# Patient Record
Sex: MALE | NOT HISPANIC OR LATINO | Employment: OTHER | URBAN - METROPOLITAN AREA
[De-identification: names, ages, dates, MRNs, and addresses within clinical notes are randomized per-mention and may not be internally consistent; named-entity substitution may affect disease eponyms.]

---

## 2024-05-04 ENCOUNTER — HOSPITAL ENCOUNTER (EMERGENCY)
Facility: HOSPITAL | Age: 69
Discharge: HOME | End: 2024-05-04
Attending: STUDENT IN AN ORGANIZED HEALTH CARE EDUCATION/TRAINING PROGRAM
Payer: MEDICARE

## 2024-05-04 ENCOUNTER — APPOINTMENT (EMERGENCY)
Dept: RADIOLOGY | Facility: HOSPITAL | Age: 69
End: 2024-05-04
Payer: MEDICARE

## 2024-05-04 VITALS
RESPIRATION RATE: 16 BRPM | SYSTOLIC BLOOD PRESSURE: 195 MMHG | HEART RATE: 74 BPM | TEMPERATURE: 97.9 F | DIASTOLIC BLOOD PRESSURE: 88 MMHG | OXYGEN SATURATION: 96 %

## 2024-05-04 DIAGNOSIS — S01.81XA FACIAL LACERATION, INITIAL ENCOUNTER: ICD-10-CM

## 2024-05-04 DIAGNOSIS — S09.90XA INJURY OF HEAD, INITIAL ENCOUNTER: Primary | ICD-10-CM

## 2024-05-04 PROCEDURE — 99284 EMERGENCY DEPT VISIT MOD MDM: CPT

## 2024-05-04 PROCEDURE — 90471 IMMUNIZATION ADMIN: CPT

## 2024-05-04 PROCEDURE — 99284 EMERGENCY DEPT VISIT MOD MDM: CPT | Performed by: PHYSICIAN ASSISTANT

## 2024-05-04 PROCEDURE — 12011 RPR F/E/E/N/L/M 2.5 CM/<: CPT | Performed by: PHYSICIAN ASSISTANT

## 2024-05-04 PROCEDURE — 90715 TDAP VACCINE 7 YRS/> IM: CPT | Performed by: PHYSICIAN ASSISTANT

## 2024-05-04 PROCEDURE — 70450 CT HEAD/BRAIN W/O DYE: CPT

## 2024-05-04 RX ORDER — GINSENG 100 MG
1 CAPSULE ORAL ONCE
Status: COMPLETED | OUTPATIENT
Start: 2024-05-04 | End: 2024-05-04

## 2024-05-04 RX ORDER — MULTIVIT WITH MINERALS/LUTEIN
1000 TABLET ORAL DAILY
COMMUNITY

## 2024-05-04 RX ORDER — EVOLOCUMAB 140 MG/ML
140 INJECTION, SOLUTION SUBCUTANEOUS
COMMUNITY

## 2024-05-04 RX ORDER — LIDOCAINE HYDROCHLORIDE AND EPINEPHRINE 10; 10 MG/ML; UG/ML
10 INJECTION, SOLUTION INFILTRATION; PERINEURAL ONCE
Status: COMPLETED | OUTPATIENT
Start: 2024-05-04 | End: 2024-05-04

## 2024-05-04 RX ORDER — LOSARTAN POTASSIUM 25 MG/1
25 TABLET ORAL DAILY
COMMUNITY

## 2024-05-04 RX ORDER — FLUOXETINE HYDROCHLORIDE 20 MG/1
20 CAPSULE ORAL DAILY
COMMUNITY

## 2024-05-04 RX ORDER — ASPIRIN 81 MG/1
81 TABLET, CHEWABLE ORAL DAILY
COMMUNITY

## 2024-05-04 RX ORDER — CLOPIDOGREL BISULFATE 75 MG/1
75 TABLET ORAL DAILY
COMMUNITY

## 2024-05-04 RX ORDER — LANSOPRAZOLE 30 MG/1
30 CAPSULE, DELAYED RELEASE ORAL DAILY
COMMUNITY

## 2024-05-04 RX ADMIN — BACITRACIN 1 SMALL APPLICATION: 500 OINTMENT TOPICAL at 08:50

## 2024-05-04 RX ADMIN — LIDOCAINE HYDROCHLORIDE,EPINEPHRINE BITARTRATE 10 ML: 10; .01 INJECTION, SOLUTION INFILTRATION; PERINEURAL at 08:50

## 2024-05-04 RX ADMIN — TETANUS TOXOID, REDUCED DIPHTHERIA TOXOID AND ACELLULAR PERTUSSIS VACCINE, ADSORBED 0.5 ML: 5; 2.5; 8; 8; 2.5 SUSPENSION INTRAMUSCULAR at 08:51

## 2024-05-04 NOTE — ED PROVIDER NOTES
History  Chief Complaint   Patient presents with    Head Injury     Pt reports fall yesterday where he tripped over a life jacket in his boat, and hit his head on the corner of his boat. Pt has lac with bruising to his right periorbital area, on a thinner, he thinks it might be coumadin. Reports no other symptoms since accident besides the laceration     67 y/o male, on Plavix, presenting today for evaluation of a right-sided head injury that occurred yesterday, while he was working on his boat, states that he tripped over a light jacket and struck the right portion of his outer eyebrow, did not lose consciousness however continues with some oozing after he changed the bandage this morning.  Unsure if he needed stitches.  He also notes an abrasion to the lower back however states this is not bothering him.  He otherwise has been feeling very well.  Denies LOC, neck or back pain, numbness, paresthesias, lightheadedness, dizziness, weakness, headaches, changes in vision.  Differential includes but is not limited to intracranial abnormality, fracture, laceration, abrasion etc.        Prior to Admission Medications   Prescriptions Last Dose Informant Patient Reported? Taking?   Ascorbic Acid (vitamin C) 1000 MG tablet 5/3/2024  Yes Yes   Sig: Take 1,000 mg by mouth daily   Evolocumab (Repatha SureClick) 140 MG/ML SOAJ   Yes Yes   Sig: Inject 140 mg under the skin every 14 (fourteen) days   FLUoxetine (PROzac) 20 mg capsule 5/3/2024  Yes Yes   Sig: Take 20 mg by mouth daily   aspirin 81 mg chewable tablet 5/3/2024  Yes Yes   Sig: Chew 81 mg daily   clopidogrel (PLAVIX) 75 mg tablet 5/3/2024  Yes Yes   Sig: Take 75 mg by mouth daily   lansoprazole (PREVACID) 30 mg capsule 5/3/2024  Yes Yes   Sig: Take 30 mg by mouth daily   losartan (COZAAR) 25 mg tablet 5/3/2024  Yes Yes   Sig: Take 25 mg by mouth daily   metoprolol tartrate (LOPRESSOR) 25 mg tablet 5/3/2024  Yes Yes   Sig: Take 25 mg by mouth every 12 (twelve) hours       Facility-Administered Medications: None       History reviewed. No pertinent past medical history.    History reviewed. No pertinent surgical history.    History reviewed. No pertinent family history.  I have reviewed and agree with the history as documented.    E-Cigarette/Vaping     E-Cigarette/Vaping Substances          Review of Systems   Constitutional: Negative.  Negative for chills, fatigue and fever.   HENT: Negative.  Negative for congestion, postnasal drip, rhinorrhea and sore throat.    Eyes: Negative.    Respiratory: Negative.  Negative for cough, shortness of breath and wheezing.    Cardiovascular: Negative.    Gastrointestinal: Negative.  Negative for abdominal pain, diarrhea, nausea and vomiting.   Endocrine: Negative.    Genitourinary: Negative.    Musculoskeletal: Negative.    Skin:  Positive for color change and wound. Negative for pallor and rash.   Neurological: Negative.    Hematological: Negative.    Psychiatric/Behavioral: Negative.     All other systems reviewed and are negative.      Physical Exam  Physical Exam  Vitals and nursing note reviewed.   Constitutional:       General: He is not in acute distress.     Appearance: Normal appearance. He is well-developed and normal weight. He is not diaphoretic.   HENT:      Head:        Right Ear: External ear normal.      Left Ear: External ear normal.      Nose: Nose normal.      Mouth/Throat:      Pharynx: No oropharyngeal exudate.   Eyes:      General: No scleral icterus.        Right eye: No discharge.         Left eye: No discharge.      Conjunctiva/sclera: Conjunctivae normal.      Pupils: Pupils are equal, round, and reactive to light.   Cardiovascular:      Rate and Rhythm: Normal rate and regular rhythm.      Pulses: Normal pulses.      Heart sounds: Normal heart sounds. No murmur heard.     No friction rub. No gallop.   Pulmonary:      Effort: Pulmonary effort is normal. No respiratory distress.      Breath sounds: Normal breath sounds.  No wheezing or rales.   Chest:      Chest wall: No tenderness.   Abdominal:      General: Bowel sounds are normal. There is no distension.      Palpations: Abdomen is soft. There is no mass.      Tenderness: There is no abdominal tenderness. There is no guarding or rebound.      Hernia: No hernia is present.   Musculoskeletal:      Cervical back: Normal range of motion and neck supple.   Lymphadenopathy:      Cervical: No cervical adenopathy.   Skin:     General: Skin is warm and dry.      Capillary Refill: Capillary refill takes less than 2 seconds.      Coloration: Skin is not pale.      Findings: No erythema or rash.          Neurological:      General: No focal deficit present.      Mental Status: He is alert and oriented to person, place, and time. Mental status is at baseline.   Psychiatric:         Thought Content: Thought content normal.         Judgment: Judgment normal.         Vital Signs  ED Triage Vitals [05/04/24 0827]   Temperature Pulse Respirations Blood Pressure SpO2   97.9 °F (36.6 °C) 74 16 (!) 195/88 96 %      Temp Source Heart Rate Source Patient Position - Orthostatic VS BP Location FiO2 (%)   Oral Monitor Sitting Right arm --      Pain Score       No Pain           Vitals:    05/04/24 0827   BP: (!) 195/88   Pulse: 74   Patient Position - Orthostatic VS: Sitting         Visual Acuity      ED Medications  Medications   lidocaine-epinephrine (XYLOCAINE/EPINEPHRINE) 1 %-1:100,000 injection 10 mL (10 mL Infiltration Given 5/4/24 0850)   bacitracin topical ointment 1 small application (1 small application Topical Given 5/4/24 0850)   tetanus-diphtheria-acellular pertussis (BOOSTRIX) IM injection 0.5 mL (0.5 mL Intramuscular Given 5/4/24 0851)       Diagnostic Studies  Results Reviewed       None                   CT head without contrast   Final Result by Seth Marquez MD (05/04 1015)      1. Mild right lateral orbital and right periorbital soft tissue edema, likely reflecting contusion.     "  2. No acute intracranial hemorrhage.                  Workstation performed: [id number]                    Procedures  Universal Protocol:  Consent: Verbal consent obtained.  Risks and benefits: risks, benefits and alternatives were discussed (including higher risks of infection)  Consent given by: patient  Time out: Immediately prior to procedure a \"time out\" was called to verify the correct patient, procedure, equipment, support staff and site/side marked as required.  Timeout called at: 5/4/2024 9:25 AM.  Patient understanding: patient states understanding of the procedure being performed  Patient consent: the patient's understanding of the procedure matches consent given  Procedure consent: procedure consent matches procedure scheduled  Relevant documents: relevant documents present and verified  Test results: test results available and properly labeled  Site marked: the operative site was marked  Radiology Images displayed and confirmed. If images not available, report reviewed: imaging studies available  Required items: required blood products, implants, devices, and special equipment available  Patient identity confirmed: verbally with patient  Laceration repair    Date/Time: 5/4/2024 9:25 AM    Performed by: Radha Brumfield PA-C  Authorized by: Radha Brumfield PA-C  Body area: head/neck  Location details: right eyebrow  Laceration length: 1.5 cm  Foreign bodies: no foreign bodies  Tendon involvement: none  Nerve involvement: none  Anesthesia: local infiltration    Anesthesia:  Local Anesthetic: lidocaine 1% with epinephrine    Sedation:  Patient sedated: no      Wound Dehiscence:  Superficial Wound Dehiscence: simple closure      Procedure Details:  Preparation: Patient was prepped and draped in the usual sterile fashion.  Irrigation solution: saline  Irrigation method: syringe  Amount of cleaning: extensive  Debridement: none  Degree of undermining: none  Skin closure: 6-0 nylon  Number of sutures: " 3  Approximation: close  Approximation difficulty: simple  Dressing: 4x4 sterile gauze and antibiotic ointment  Patient tolerance: patient tolerated the procedure well with no immediate complications               ED Course  ED Course as of 05/04/24 1022   Sat May 04, 2024   0952 Going to CT                                SBIRT 20yo+      Flowsheet Row Most Recent Value   Initial Alcohol Screen: US AUDIT-C     1. How often do you have a drink containing alcohol? 0 Filed at: 05/04/2024 0937   2. How many drinks containing alcohol do you have on a typical day you are drinking?  0 Filed at: 05/04/2024 0937   3a. Male UNDER 65: How often do you have five or more drinks on one occasion? 0 Filed at: 05/04/2024 0937   3b. FEMALE Any Age, or MALE 65+: How often do you have 4 or more drinks on one occassion? 0 Filed at: 05/04/2024 0937   Audit-C Score 0 Filed at: 05/04/2024 0937   Full Alcohol Screen: US AUDIT    4. How often during the last year have you found that you were not able to stop drinking once you had started? 0 Filed at: 05/04/2024 0937   5. How often during past year have you failed to do what was normally expected of you because of drinking?  0 Filed at: 05/04/2024 0937   6. How often in past year have you needed a first drink in the morning to get yourself going after a heavy drinking session?  0 Filed at: 05/04/2024 0937   7. How often in past year have you had feeling of guilt or remorse after drinking?  0 Filed at: 05/04/2024 0937   8. How often in past year have you been unable to remember what happened night before because you had been drinking?  0 Filed at: 05/04/2024 0937   9. Have you or someone else been injured as a result of your drinking?  0 Filed at: 05/04/2024 0937   10. Has a relative, friend, doctor or other health worker been concerned about your drinking and suggested you cut down?  0 Filed at: 05/04/2024 0937   AUDIT Total Score 0 Filed at: 05/04/2024 0937   ABIDA: How many times in the past  year have you...    Used an illegal drug or used a prescription medication for non-medical reasons? Never Filed at: 05/04/2024 0937                      Medical Decision Making  Patient does not recall last tetanus vaccination.  Discussed multiple treatment options with the patient, he is agreeable to laceration repair however is aware that given delayed closure this may increase risk of infection.  Patient also agreeable to head CT.  There is no flank bruising or back bruising, superficial abrasions noted there is no reproducible tenderness.  Patient is otherwise feeling very well after his injury that occurred approximately 15 hours ago.    3 sutures placed- informed to return in 5 days for suture removal or sooner for signs of infection. Wound care education given. No acute intracranial abnormality.      The patient verbalizes understanding and agrees with above assessment and plan. All questions were answered.          Amount and/or Complexity of Data Reviewed  Radiology: ordered.    Risk  OTC drugs.  Prescription drug management.             Disposition  Final diagnoses:   Injury of head, initial encounter   Facial laceration, initial encounter     Time reflects when diagnosis was documented in both MDM as applicable and the Disposition within this note       Time User Action Codes Description Comment    5/4/2024 10:22 AM Radha Brumfiedl Add [S09.90XA] Injury of head, initial encounter     5/4/2024 10:22 AM Radha Brumfield Add [S01.81XA] Facial laceration, initial encounter           ED Disposition       ED Disposition   Discharge    Condition   Stable    Date/Time   Sat May 4, 2024 1022    Comment   Chalo Ahuja discharge to home/self care.                   Follow-up Information       Follow up With Specialties Details Why Contact Info Additional Information    [facility] Emergency Department Emergency Medicine Go in 5 days For suture removal, or sooner for any signs of infection  [address] [facility] [address]  [phone] [facility] Emergency Department, [address], [place], [address]            Patient's Medications   Discharge Prescriptions    No medications on file       No discharge procedures on file.    PDMP Review       None            ED Provider  Electronically Signed by             Radha Brumfield PA-C  05/04/24 1023

## 2024-07-27 ENCOUNTER — APPOINTMENT (EMERGENCY)
Dept: RADIOLOGY | Facility: HOSPITAL | Age: 69
DRG: 309 | End: 2024-07-27
Payer: MEDICARE

## 2024-07-27 ENCOUNTER — HOSPITAL ENCOUNTER (INPATIENT)
Facility: HOSPITAL | Age: 69
LOS: 2 days | Discharge: HOME | DRG: 309 | End: 2024-07-29
Attending: EMERGENCY MEDICINE | Admitting: STUDENT IN AN ORGANIZED HEALTH CARE EDUCATION/TRAINING PROGRAM
Payer: MEDICARE

## 2024-07-27 DIAGNOSIS — F10.10 ALCOHOL ABUSE: ICD-10-CM

## 2024-07-27 DIAGNOSIS — I48.91 ATRIAL FIBRILLATION (HCC): Primary | ICD-10-CM

## 2024-07-27 DIAGNOSIS — F10.939 ALCOHOL WITHDRAWAL (HCC): ICD-10-CM

## 2024-07-27 DIAGNOSIS — I24.9 ACS (ACUTE CORONARY SYNDROME) (HCC): ICD-10-CM

## 2024-07-27 DIAGNOSIS — Z72.0 TOBACCO ABUSE: ICD-10-CM

## 2024-07-27 PROBLEM — E87.8 ELECTROLYTE ABNORMALITY: Status: ACTIVE | Noted: 2024-07-27

## 2024-07-27 PROBLEM — F32.A DEPRESSION: Status: ACTIVE | Noted: 2020-10-14

## 2024-07-27 PROBLEM — E55.9 VITAMIN D DEFICIENCY: Status: ACTIVE | Noted: 2020-10-14

## 2024-07-27 PROBLEM — E78.2 MIXED HYPERLIPIDEMIA: Status: ACTIVE | Noted: 2020-10-14

## 2024-07-27 PROBLEM — Z95.5 S/P DRUG ELUTING CORONARY STENT PLACEMENT: Status: ACTIVE | Noted: 2020-10-14

## 2024-07-27 PROBLEM — K21.9 GASTROESOPHAGEAL REFLUX DISEASE WITHOUT ESOPHAGITIS: Status: ACTIVE | Noted: 2020-10-14

## 2024-07-27 PROBLEM — E87.21 ACUTE METABOLIC ACIDOSIS: Status: ACTIVE | Noted: 2024-07-27

## 2024-07-27 PROBLEM — D72.829 LEUKOCYTOSIS: Status: ACTIVE | Noted: 2024-07-27

## 2024-07-27 PROBLEM — I25.10 CAD IN NATIVE ARTERY: Status: ACTIVE | Noted: 2020-10-14

## 2024-07-27 LAB
2HR DELTA HS TROPONIN: 242 NG/L
4HR DELTA HS TROPONIN: 314 NG/L
ALBUMIN SERPL BCG-MCNC: 4.1 G/DL (ref 3.5–5)
ALP SERPL-CCNC: 75 U/L (ref 34–104)
ALT SERPL W P-5'-P-CCNC: 35 U/L (ref 7–52)
ANION GAP SERPL CALCULATED.3IONS-SCNC: 20 MMOL/L (ref 4–13)
ANION GAP SERPL CALCULATED.3IONS-SCNC: 25 MMOL/L (ref 4–13)
APTT PPP: 24 SECONDS (ref 23–37)
APTT PPP: 24 SECONDS (ref 23–37)
APTT PPP: 45 SECONDS (ref 23–37)
AST SERPL W P-5'-P-CCNC: 57 U/L (ref 13–39)
ATRIAL RATE: 163 BPM
BASOPHILS # BLD AUTO: 0.09 THOUSANDS/ÂΜL (ref 0–0.1)
BASOPHILS NFR BLD AUTO: 1 % (ref 0–1)
BILIRUB SERPL-MCNC: 1.05 MG/DL (ref 0.2–1)
BNP SERPL-MCNC: 44 PG/ML (ref 0–100)
BUN SERPL-MCNC: 18 MG/DL (ref 5–25)
BUN SERPL-MCNC: 20 MG/DL (ref 5–25)
CALCIUM SERPL-MCNC: 8.7 MG/DL (ref 8.4–10.2)
CALCIUM SERPL-MCNC: 9.2 MG/DL (ref 8.4–10.2)
CARDIAC TROPONIN I PNL SERPL HS: 270 NG/L
CARDIAC TROPONIN I PNL SERPL HS: 28 NG/L
CARDIAC TROPONIN I PNL SERPL HS: 342 NG/L
CHLORIDE SERPL-SCNC: 102 MMOL/L (ref 96–108)
CHLORIDE SERPL-SCNC: 105 MMOL/L (ref 96–108)
CO2 SERPL-SCNC: 14 MMOL/L (ref 21–32)
CO2 SERPL-SCNC: 17 MMOL/L (ref 21–32)
CREAT SERPL-MCNC: 0.76 MG/DL (ref 0.6–1.3)
CREAT SERPL-MCNC: 0.85 MG/DL (ref 0.6–1.3)
D DIMER PPP FEU-MCNC: 0.57 UG/ML FEU
EOSINOPHIL # BLD AUTO: 0.17 THOUSAND/ÂΜL (ref 0–0.61)
EOSINOPHIL NFR BLD AUTO: 1 % (ref 0–6)
ERYTHROCYTE [DISTWIDTH] IN BLOOD BY AUTOMATED COUNT: 12.4 % (ref 11.6–15.1)
ETHANOL SERPL-MCNC: 60 MG/DL
FLUAV RNA RESP QL NAA+PROBE: NEGATIVE
FLUBV RNA RESP QL NAA+PROBE: NEGATIVE
GFR SERPL CREATININE-BSD FRML MDRD: 89 ML/MIN/1.73SQ M
GFR SERPL CREATININE-BSD FRML MDRD: 93 ML/MIN/1.73SQ M
GLUCOSE SERPL-MCNC: 67 MG/DL (ref 65–140)
GLUCOSE SERPL-MCNC: 75 MG/DL (ref 65–140)
HCT VFR BLD AUTO: 42.7 % (ref 36.5–49.3)
HGB BLD-MCNC: 14.6 G/DL (ref 12–17)
IMM GRANULOCYTES # BLD AUTO: 0.1 THOUSAND/UL (ref 0–0.2)
IMM GRANULOCYTES NFR BLD AUTO: 1 % (ref 0–2)
INR PPP: 0.98 (ref 0.84–1.19)
INR PPP: 0.99 (ref 0.84–1.19)
LACTATE SERPL-SCNC: 0.7 MMOL/L (ref 0.5–2)
LACTATE SERPL-SCNC: 10.1 MMOL/L (ref 0.5–2)
LACTATE SERPL-SCNC: 4.8 MMOL/L (ref 0.5–2)
LIPASE SERPL-CCNC: 33 U/L (ref 11–82)
LYMPHOCYTES # BLD AUTO: 6.12 THOUSANDS/ÂΜL (ref 0.6–4.47)
LYMPHOCYTES NFR BLD AUTO: 33 % (ref 14–44)
MAGNESIUM SERPL-MCNC: 1.6 MG/DL (ref 1.9–2.7)
MAGNESIUM SERPL-MCNC: 2.3 MG/DL (ref 1.9–2.7)
MCH RBC QN AUTO: 34.8 PG (ref 26.8–34.3)
MCHC RBC AUTO-ENTMCNC: 34.2 G/DL (ref 31.4–37.4)
MCV RBC AUTO: 102 FL (ref 82–98)
MONOCYTES # BLD AUTO: 1.32 THOUSAND/ÂΜL (ref 0.17–1.22)
MONOCYTES NFR BLD AUTO: 7 % (ref 4–12)
NEUTROPHILS # BLD AUTO: 11.02 THOUSANDS/ÂΜL (ref 1.85–7.62)
NEUTS SEG NFR BLD AUTO: 57 % (ref 43–75)
NRBC BLD AUTO-RTO: 0 /100 WBCS
PLATELET # BLD AUTO: 208 THOUSANDS/UL (ref 149–390)
PMV BLD AUTO: 9.3 FL (ref 8.9–12.7)
POTASSIUM SERPL-SCNC: 2.9 MMOL/L (ref 3.5–5.3)
POTASSIUM SERPL-SCNC: 4 MMOL/L (ref 3.5–5.3)
PROT SERPL-MCNC: 7 G/DL (ref 6.4–8.4)
PROTHROMBIN TIME: 13.2 SECONDS (ref 11.6–14.5)
PROTHROMBIN TIME: 13.3 SECONDS (ref 11.6–14.5)
QRS AXIS: -2 DEGREES
QRSD INTERVAL: 104 MS
QT INTERVAL: 308 MS
QTC INTERVAL: 479 MS
RBC # BLD AUTO: 4.19 MILLION/UL (ref 3.88–5.62)
RSV RNA RESP QL NAA+PROBE: NEGATIVE
SARS-COV-2 RNA RESP QL NAA+PROBE: NEGATIVE
SODIUM SERPL-SCNC: 141 MMOL/L (ref 135–147)
SODIUM SERPL-SCNC: 142 MMOL/L (ref 135–147)
T WAVE AXIS: 38 DEGREES
TSH SERPL DL<=0.05 MIU/L-ACNC: 0.38 UIU/ML (ref 0.45–4.5)
VENTRICULAR RATE: 146 BPM
WBC # BLD AUTO: 18.82 THOUSAND/UL (ref 4.31–10.16)

## 2024-07-27 PROCEDURE — 85610 PROTHROMBIN TIME: CPT | Performed by: EMERGENCY MEDICINE

## 2024-07-27 PROCEDURE — 85730 THROMBOPLASTIN TIME PARTIAL: CPT | Performed by: STUDENT IN AN ORGANIZED HEALTH CARE EDUCATION/TRAINING PROGRAM

## 2024-07-27 PROCEDURE — 82607 VITAMIN B-12: CPT | Performed by: STUDENT IN AN ORGANIZED HEALTH CARE EDUCATION/TRAINING PROGRAM

## 2024-07-27 PROCEDURE — 85730 THROMBOPLASTIN TIME PARTIAL: CPT | Performed by: EMERGENCY MEDICINE

## 2024-07-27 PROCEDURE — 84484 ASSAY OF TROPONIN QUANT: CPT | Performed by: STUDENT IN AN ORGANIZED HEALTH CARE EDUCATION/TRAINING PROGRAM

## 2024-07-27 PROCEDURE — 83880 ASSAY OF NATRIURETIC PEPTIDE: CPT | Performed by: EMERGENCY MEDICINE

## 2024-07-27 PROCEDURE — 99291 CRITICAL CARE FIRST HOUR: CPT | Performed by: EMERGENCY MEDICINE

## 2024-07-27 PROCEDURE — 84443 ASSAY THYROID STIM HORMONE: CPT | Performed by: EMERGENCY MEDICINE

## 2024-07-27 PROCEDURE — 80053 COMPREHEN METABOLIC PANEL: CPT | Performed by: EMERGENCY MEDICINE

## 2024-07-27 PROCEDURE — 83735 ASSAY OF MAGNESIUM: CPT | Performed by: EMERGENCY MEDICINE

## 2024-07-27 PROCEDURE — 84484 ASSAY OF TROPONIN QUANT: CPT | Performed by: EMERGENCY MEDICINE

## 2024-07-27 PROCEDURE — 83735 ASSAY OF MAGNESIUM: CPT | Performed by: STUDENT IN AN ORGANIZED HEALTH CARE EDUCATION/TRAINING PROGRAM

## 2024-07-27 PROCEDURE — 87040 BLOOD CULTURE FOR BACTERIA: CPT | Performed by: STUDENT IN AN ORGANIZED HEALTH CARE EDUCATION/TRAINING PROGRAM

## 2024-07-27 PROCEDURE — 82077 ASSAY SPEC XCP UR&BREATH IA: CPT | Performed by: EMERGENCY MEDICINE

## 2024-07-27 PROCEDURE — 93005 ELECTROCARDIOGRAM TRACING: CPT

## 2024-07-27 PROCEDURE — 83605 ASSAY OF LACTIC ACID: CPT | Performed by: STUDENT IN AN ORGANIZED HEALTH CARE EDUCATION/TRAINING PROGRAM

## 2024-07-27 PROCEDURE — 85025 COMPLETE CBC W/AUTO DIFF WBC: CPT | Performed by: EMERGENCY MEDICINE

## 2024-07-27 PROCEDURE — 0241U HB NFCT DS VIR RESP RNA 4 TRGT: CPT | Performed by: EMERGENCY MEDICINE

## 2024-07-27 PROCEDURE — 83690 ASSAY OF LIPASE: CPT | Performed by: EMERGENCY MEDICINE

## 2024-07-27 PROCEDURE — 99223 1ST HOSP IP/OBS HIGH 75: CPT | Performed by: STUDENT IN AN ORGANIZED HEALTH CARE EDUCATION/TRAINING PROGRAM

## 2024-07-27 PROCEDURE — 36415 COLL VENOUS BLD VENIPUNCTURE: CPT | Performed by: EMERGENCY MEDICINE

## 2024-07-27 PROCEDURE — 85610 PROTHROMBIN TIME: CPT | Performed by: STUDENT IN AN ORGANIZED HEALTH CARE EDUCATION/TRAINING PROGRAM

## 2024-07-27 PROCEDURE — 85379 FIBRIN DEGRADATION QUANT: CPT | Performed by: EMERGENCY MEDICINE

## 2024-07-27 PROCEDURE — 1123F ACP DISCUSS/DSCN MKR DOCD: CPT | Performed by: INTERNAL MEDICINE

## 2024-07-27 PROCEDURE — 80048 BASIC METABOLIC PNL TOTAL CA: CPT | Performed by: STUDENT IN AN ORGANIZED HEALTH CARE EDUCATION/TRAINING PROGRAM

## 2024-07-27 PROCEDURE — 99285 EMERGENCY DEPT VISIT HI MDM: CPT

## 2024-07-27 PROCEDURE — 96375 TX/PRO/DX INJ NEW DRUG ADDON: CPT

## 2024-07-27 PROCEDURE — 96368 THER/DIAG CONCURRENT INF: CPT

## 2024-07-27 PROCEDURE — 71045 X-RAY EXAM CHEST 1 VIEW: CPT

## 2024-07-27 PROCEDURE — 82746 ASSAY OF FOLIC ACID SERUM: CPT | Performed by: STUDENT IN AN ORGANIZED HEALTH CARE EDUCATION/TRAINING PROGRAM

## 2024-07-27 PROCEDURE — 83605 ASSAY OF LACTIC ACID: CPT

## 2024-07-27 PROCEDURE — 96365 THER/PROPH/DIAG IV INF INIT: CPT

## 2024-07-27 RX ORDER — PANTOPRAZOLE SODIUM 40 MG/1
40 TABLET, DELAYED RELEASE ORAL
Status: DISCONTINUED | OUTPATIENT
Start: 2024-07-27 | End: 2024-07-27

## 2024-07-27 RX ORDER — ACETAMINOPHEN 325 MG/1
650 TABLET ORAL EVERY 8 HOURS PRN
Status: DISCONTINUED | OUTPATIENT
Start: 2024-07-27 | End: 2024-07-29 | Stop reason: HOSPADM

## 2024-07-27 RX ORDER — PANTOPRAZOLE SODIUM 40 MG/10ML
40 INJECTION, POWDER, LYOPHILIZED, FOR SOLUTION INTRAVENOUS
Status: DISCONTINUED | OUTPATIENT
Start: 2024-07-27 | End: 2024-07-27

## 2024-07-27 RX ORDER — ASPIRIN 81 MG/1
81 TABLET, CHEWABLE ORAL DAILY
Status: DISCONTINUED | OUTPATIENT
Start: 2024-07-27 | End: 2024-07-29 | Stop reason: HOSPADM

## 2024-07-27 RX ORDER — HEPARIN SODIUM 1000 [USP'U]/ML
2000 INJECTION, SOLUTION INTRAVENOUS; SUBCUTANEOUS EVERY 6 HOURS PRN
Status: DISCONTINUED | OUTPATIENT
Start: 2024-07-27 | End: 2024-07-28

## 2024-07-27 RX ORDER — POTASSIUM CHLORIDE 20 MEQ/1
60 TABLET, EXTENDED RELEASE ORAL ONCE
Status: COMPLETED | OUTPATIENT
Start: 2024-07-27 | End: 2024-07-27

## 2024-07-27 RX ORDER — LANOLIN ALCOHOL/MO/W.PET/CERES
800 CREAM (GRAM) TOPICAL DAILY
Status: DISCONTINUED | OUTPATIENT
Start: 2024-07-27 | End: 2024-07-27

## 2024-07-27 RX ORDER — FLUOXETINE HYDROCHLORIDE 20 MG/1
20 CAPSULE ORAL DAILY
Status: DISCONTINUED | OUTPATIENT
Start: 2024-07-27 | End: 2024-07-29 | Stop reason: HOSPADM

## 2024-07-27 RX ORDER — ONDANSETRON 2 MG/ML
4 INJECTION INTRAMUSCULAR; INTRAVENOUS ONCE
Status: COMPLETED | OUTPATIENT
Start: 2024-07-27 | End: 2024-07-27

## 2024-07-27 RX ORDER — LORAZEPAM 2 MG/ML
0.5 INJECTION INTRAMUSCULAR ONCE
Status: DISCONTINUED | OUTPATIENT
Start: 2024-07-27 | End: 2024-07-27

## 2024-07-27 RX ORDER — DILTIAZEM HYDROCHLORIDE 5 MG/ML
15 INJECTION INTRAVENOUS ONCE
Status: COMPLETED | OUTPATIENT
Start: 2024-07-27 | End: 2024-07-27

## 2024-07-27 RX ORDER — PANTOPRAZOLE SODIUM 40 MG/1
40 TABLET, DELAYED RELEASE ORAL
Status: DISCONTINUED | OUTPATIENT
Start: 2024-07-28 | End: 2024-07-27

## 2024-07-27 RX ORDER — LANOLIN ALCOHOL/MO/W.PET/CERES
100 CREAM (GRAM) TOPICAL DAILY
Status: DISCONTINUED | OUTPATIENT
Start: 2024-07-27 | End: 2024-07-27

## 2024-07-27 RX ORDER — LORAZEPAM 1 MG/1
2 TABLET ORAL ONCE
Status: COMPLETED | OUTPATIENT
Start: 2024-07-27 | End: 2024-07-27

## 2024-07-27 RX ORDER — NICOTINE 21 MG/24HR
1 PATCH, TRANSDERMAL 24 HOURS TRANSDERMAL DAILY
Status: DISCONTINUED | OUTPATIENT
Start: 2024-07-27 | End: 2024-07-29 | Stop reason: HOSPADM

## 2024-07-27 RX ORDER — HEPARIN SODIUM 1000 [USP'U]/ML
4000 INJECTION, SOLUTION INTRAVENOUS; SUBCUTANEOUS EVERY 6 HOURS PRN
Status: DISCONTINUED | OUTPATIENT
Start: 2024-07-27 | End: 2024-07-28

## 2024-07-27 RX ORDER — PANTOPRAZOLE SODIUM 40 MG/10ML
40 INJECTION, POWDER, LYOPHILIZED, FOR SOLUTION INTRAVENOUS
Status: DISCONTINUED | OUTPATIENT
Start: 2024-07-28 | End: 2024-07-29 | Stop reason: HOSPADM

## 2024-07-27 RX ORDER — THIAMINE HYDROCHLORIDE 100 MG/ML
100 INJECTION, SOLUTION INTRAMUSCULAR; INTRAVENOUS ONCE
Status: DISCONTINUED | OUTPATIENT
Start: 2024-07-27 | End: 2024-07-27

## 2024-07-27 RX ORDER — POTASSIUM CHLORIDE 14.9 MG/ML
20 INJECTION INTRAVENOUS ONCE
Status: COMPLETED | OUTPATIENT
Start: 2024-07-27 | End: 2024-07-27

## 2024-07-27 RX ORDER — HEPARIN SODIUM 10000 [USP'U]/100ML
3-20 INJECTION, SOLUTION INTRAVENOUS
Status: DISCONTINUED | OUTPATIENT
Start: 2024-07-27 | End: 2024-07-28

## 2024-07-27 RX ORDER — METOPROLOL TARTRATE 1 MG/ML
5 INJECTION, SOLUTION INTRAVENOUS EVERY 6 HOURS PRN
Status: DISCONTINUED | OUTPATIENT
Start: 2024-07-27 | End: 2024-07-29 | Stop reason: HOSPADM

## 2024-07-27 RX ORDER — MAGNESIUM SULFATE HEPTAHYDRATE 40 MG/ML
4 INJECTION, SOLUTION INTRAVENOUS ONCE
Status: COMPLETED | OUTPATIENT
Start: 2024-07-27 | End: 2024-07-27

## 2024-07-27 RX ORDER — LANOLIN ALCOHOL/MO/W.PET/CERES
800 CREAM (GRAM) TOPICAL DAILY
Status: DISCONTINUED | OUTPATIENT
Start: 2024-07-28 | End: 2024-07-29 | Stop reason: HOSPADM

## 2024-07-27 RX ORDER — LANOLIN ALCOHOL/MO/W.PET/CERES
100 CREAM (GRAM) TOPICAL DAILY
Status: DISCONTINUED | OUTPATIENT
Start: 2024-07-28 | End: 2024-07-29 | Stop reason: HOSPADM

## 2024-07-27 RX ORDER — HEPARIN SODIUM 1000 [USP'U]/ML
4000 INJECTION, SOLUTION INTRAVENOUS; SUBCUTANEOUS ONCE
Status: COMPLETED | OUTPATIENT
Start: 2024-07-27 | End: 2024-07-27

## 2024-07-27 RX ORDER — CALCIUM CARBONATE 500 MG/1
500 TABLET, CHEWABLE ORAL DAILY PRN
Status: DISCONTINUED | OUTPATIENT
Start: 2024-07-27 | End: 2024-07-29 | Stop reason: HOSPADM

## 2024-07-27 RX ORDER — CLOPIDOGREL BISULFATE 75 MG/1
75 TABLET ORAL DAILY
Status: DISCONTINUED | OUTPATIENT
Start: 2024-07-27 | End: 2024-07-27

## 2024-07-27 RX ORDER — SODIUM CHLORIDE 9 MG/ML
125 INJECTION, SOLUTION INTRAVENOUS CONTINUOUS
Status: DISCONTINUED | OUTPATIENT
Start: 2024-07-27 | End: 2024-07-28

## 2024-07-27 RX ADMIN — SODIUM CHLORIDE 1000 ML: 0.9 INJECTION, SOLUTION INTRAVENOUS at 16:48

## 2024-07-27 RX ADMIN — SODIUM CHLORIDE 125 ML/HR: 0.9 INJECTION, SOLUTION INTRAVENOUS at 17:04

## 2024-07-27 RX ADMIN — LORAZEPAM 2 MG: 1 TABLET ORAL at 21:59

## 2024-07-27 RX ADMIN — POTASSIUM CHLORIDE 20 MEQ: 14.9 INJECTION, SOLUTION INTRAVENOUS at 14:23

## 2024-07-27 RX ADMIN — NICOTINE 1 PATCH: 21 PATCH, EXTENDED RELEASE TRANSDERMAL at 16:50

## 2024-07-27 RX ADMIN — ONDANSETRON 4 MG: 2 INJECTION INTRAMUSCULAR; INTRAVENOUS at 12:58

## 2024-07-27 RX ADMIN — HEPARIN SODIUM 12 UNITS/KG/HR: 10000 INJECTION, SOLUTION INTRAVENOUS at 16:46

## 2024-07-27 RX ADMIN — MAGNESIUM SULFATE HEPTAHYDRATE 4 G: 40 INJECTION, SOLUTION INTRAVENOUS at 13:46

## 2024-07-27 RX ADMIN — DILTIAZEM HYDROCHLORIDE 15 MG: 5 INJECTION INTRAVENOUS at 12:58

## 2024-07-27 RX ADMIN — SODIUM CHLORIDE 500 ML: 0.9 INJECTION, SOLUTION INTRAVENOUS at 13:46

## 2024-07-27 RX ADMIN — SODIUM CHLORIDE 125 ML/HR: 0.9 INJECTION, SOLUTION INTRAVENOUS at 23:51

## 2024-07-27 RX ADMIN — ANTACID TABLETS 500 MG: 500 TABLET, CHEWABLE ORAL at 16:49

## 2024-07-27 RX ADMIN — LORAZEPAM 2 MG: 1 TABLET ORAL at 16:44

## 2024-07-27 RX ADMIN — SODIUM CHLORIDE 500 ML: 0.9 INJECTION, SOLUTION INTRAVENOUS at 19:49

## 2024-07-27 RX ADMIN — HEPARIN SODIUM 4000 UNITS: 1000 INJECTION INTRAVENOUS; SUBCUTANEOUS at 16:44

## 2024-07-27 RX ADMIN — SODIUM CHLORIDE 500 ML: 0.9 INJECTION, SOLUTION INTRAVENOUS at 14:23

## 2024-07-27 RX ADMIN — HEPARIN SODIUM 2000 UNITS: 1000 INJECTION INTRAVENOUS; SUBCUTANEOUS at 23:47

## 2024-07-27 RX ADMIN — POTASSIUM CHLORIDE 60 MEQ: 1500 TABLET, EXTENDED RELEASE ORAL at 13:38

## 2024-07-27 RX ADMIN — Medication 12.5 MG: at 21:59

## 2024-07-27 RX ADMIN — FLUOXETINE HYDROCHLORIDE 20 MG: 20 CAPSULE ORAL at 16:49

## 2024-07-27 RX ADMIN — THIAMINE HYDROCHLORIDE 100 MG: 100 INJECTION, SOLUTION INTRAMUSCULAR; INTRAVENOUS at 17:04

## 2024-07-27 NOTE — ASSESSMENT & PLAN NOTE
Leukocytosis likely reactive due to dehydration and afib   Respiratory panel negative  Lactic acid normalized  BC x2 pending  No signs/symptoms of infection, monitor off ABX  WBC down trending to 10K

## 2024-07-27 NOTE — QUICK NOTE
Progress Note - Triage Assessment   Chalo Ahuja 68 y.o. male MRN: [id number]    Date Called: 07/27/24  Room#: ED9  Time Evaluated: 1455  Person requesting evaluation: Dr. Haynes    Called to ED to evaluate patient for possible admission to Critical Care Service, chart reviewed and patient evaluated.     He presented today with substernal chest pressure. He reports history of daily drinking (15 shots of vodka/daily), but indulged in more alcohol last night secondary to his mother passing away recently. Troponins negative x2 and EKG without evidence of ischemic changes. However, patient was found to be in afib RVR. He was given 15 mg bolus of cardizem with subsequent drop in heart rate and blood pressure.     Patient was seen and evaluated bedside with 1.5 L IVF bolus infused. SBP were > 120 with HR < 120.    Case discussed with Critical Care attending Dr. Jamison and after review it was felt the patient is appropriate for admission to a general medical floor.    Recommendations discussed with ED attending Dr. Haynes    Resume patient's PO BB (12.5 mg)  Consider IV dose of metoprolol. Can start with 2.5 mg and given additional 2.5 mg   Monitor for alcohol withdrawal           Triage Assessment:     Patient appropriate to be admitted to Stepdown Level 2 level of care.    If any questions or concerns please call the critical care team via Secure Chat.

## 2024-07-27 NOTE — ED PROVIDER NOTES
History  Chief Complaint   Patient presents with    Chest Pain     Pt arrived EMS. Pt c/o chest pain starting today described as sharp/pressure. Radiating to left side. Pt c/o nausea and presents diaphoretic. Pt given  325 aspirin by EMS. Pt reports no relief and pain is increasing currently 5/10. Pt reports cardiac hx. Pt reports drinking alcohol today and has hx of alcohol abuse.      68-year-old male presents with chest pressure nonradiating that started this morning he drinks alcohol regularly last alcoholic drink was last night.  Denies any shortness of breath pleurisy cough congestion fevers chills nausea vomiting abdominal pain diarrhea or any other symptoms.  History of CAD currently on aspirin and Plavix.  Currently also having palpitations nausea and some diaphoresis.      History provided by:  Patient   used: No        Prior to Admission Medications   Prescriptions Last Dose Informant Patient Reported? Taking?   Ascorbic Acid (vitamin C) 1000 MG tablet 7/26/2024  Yes Yes   Sig: Take 1,000 mg by mouth daily   Evolocumab (Repatha SureClick) 140 MG/ML SOAJ Past Week  Yes Yes   Sig: Inject 140 mg under the skin every 14 (fourteen) days   FLUoxetine (PROzac) 20 mg capsule 7/26/2024  Yes Yes   Sig: Take 20 mg by mouth daily   aspirin 81 mg chewable tablet 7/26/2024  Yes Yes   Sig: Chew 81 mg daily   clopidogrel (PLAVIX) 75 mg tablet 7/26/2024  Yes Yes   Sig: Take 75 mg by mouth daily   lansoprazole (PREVACID) 30 mg capsule 7/26/2024  Yes Yes   Sig: Take 30 mg by mouth daily   losartan (COZAAR) 25 mg tablet 7/26/2024  Yes Yes   Sig: Take 25 mg by mouth daily   metoprolol tartrate (LOPRESSOR) 25 mg tablet 7/26/2024  Yes Yes   Sig: Take 25 mg by mouth every 12 (twelve) hours      Facility-Administered Medications: None       Past Medical History:   Diagnosis Date    Leukocytosis 07/27/2024       History reviewed. No pertinent surgical history.    History reviewed. No pertinent family  history.  I have reviewed and agree with the history as documented.    E-Cigarette/Vaping    E-Cigarette Use Never User      E-Cigarette/Vaping Substances    Nicotine No     THC No     CBD No     Flavoring No     Other No     Unknown No      Social History     Tobacco Use    Smoking status: Every Day     Current packs/day: 0.50     Types: Cigarettes    Smokeless tobacco: Never   Vaping Use    Vaping status: Never Used   Substance Use Topics    Alcohol use: Yes     Alcohol/week: 5.0 standard drinks of alcohol     Types: 5 Shots of liquor per week    Drug use: Never       Review of Systems   Constitutional: Negative.         Chest pressure palpitations nausea diaphoresis   HENT: Negative.     Eyes: Negative.    Respiratory: Negative.     Cardiovascular:  Positive for chest pain and palpitations.   Gastrointestinal: Negative.    Endocrine: Negative.    Genitourinary: Negative.    Musculoskeletal: Negative.    Skin: Negative.    Allergic/Immunologic: Negative.    Neurological: Negative.    Hematological: Negative.    Psychiatric/Behavioral: Negative.     All other systems reviewed and are negative.      Physical Exam  Physical Exam  Vitals and nursing note reviewed.   Constitutional:       General: He is in acute distress.      Appearance: Normal appearance. He is ill-appearing.   HENT:      Head: Normocephalic and atraumatic.      Nose: Nose normal.      Mouth/Throat:      Mouth: Mucous membranes are moist.   Eyes:      Extraocular Movements: Extraocular movements intact.      Pupils: Pupils are equal, round, and reactive to light.   Cardiovascular:      Rate and Rhythm: Tachycardia present. Rhythm irregular.   Pulmonary:      Effort: Pulmonary effort is normal.      Breath sounds: Normal breath sounds.   Abdominal:      General: Abdomen is flat. Bowel sounds are normal.      Palpations: Abdomen is soft.   Musculoskeletal:         General: Normal range of motion.      Cervical back: Normal range of motion and neck  supple.   Skin:     General: Skin is warm.      Capillary Refill: Capillary refill takes less than 2 seconds.   Neurological:      General: No focal deficit present.      Mental Status: He is alert and oriented to person, place, and time. Mental status is at baseline.   Psychiatric:         Mood and Affect: Mood normal.         Thought Content: Thought content normal.         Vital Signs  ED Triage Vitals [07/27/24 1249]   Temperature Pulse Respirations Blood Pressure SpO2   97.7 °F (36.5 °C) (!) 131 18 123/88 96 %      Temp Source Heart Rate Source Patient Position - Orthostatic VS BP Location FiO2 (%)   Oral Monitor Sitting Right arm --      Pain Score       5           Vitals:    07/29/24 0833 07/29/24 1223 07/29/24 1609 07/29/24 1610   BP: 170/94 (!) 177/93 (!) 178/92 (!) 178/92   Pulse: 67 73  70   Patient Position - Orthostatic VS: Lying            Visual Acuity      ED Medications  Medications   ondansetron (ZOFRAN) injection 4 mg (4 mg Intravenous Given 7/27/24 1258)   diltiazem (CARDIZEM) injection 15 mg (15 mg Intravenous Given 7/27/24 1258)   magnesium sulfate 4 g/100 mL IVPB (premix) 4 g (4 g Intravenous New Bag 7/27/24 1346)   potassium chloride (Klor-Con M20) CR tablet 60 mEq (60 mEq Oral Given 7/27/24 1338)   potassium chloride 20 mEq IVPB (premix) (20 mEq Intravenous New Bag 7/27/24 1423)   sodium chloride 0.9 % bolus 500 mL (500 mL Intravenous New Bag 7/27/24 1346)   sodium chloride 0.9 % bolus 500 mL (500 mL Intravenous New Bag 7/27/24 1423)   sodium chloride 0.9 % bolus 1,000 mL (1,000 mL Intravenous New Bag 7/27/24 1648)   heparin (porcine) injection 4,000 Units (4,000 Units Intravenous Given 7/27/24 1644)   LORazepam (ATIVAN) tablet 2 mg (2 mg Oral Given 7/27/24 1644)   thiamine (VITAMIN B1) 100 mg in sodium chloride 0.9 % 50 mL IVPB (100 mg Intravenous New Bag 7/27/24 1704)   sodium chloride 0.9 % bolus 500 mL (500 mL Intravenous New Bag 7/27/24 1949)   LORazepam (ATIVAN) tablet 2 mg (2 mg Oral  "Given 7/27/24 2159)   LORazepam (ATIVAN) tablet 2 mg (2 mg Oral Given 7/29/24 0104)   regadenoson (LEXISCAN) injection 0.4 mg (0.4 mg Intravenous Given 7/29/24 1036)   potassium chloride (Klor-Con M20) CR tablet 40 mEq (40 mEq Oral Given 7/29/24 1702)   furosemide (LASIX) injection 20 mg (20 mg Intravenous Given 7/29/24 1221)       Diagnostic Studies  Results Reviewed       Procedure Component Value Units Date/Time    Blood culture #1 [[id number]] Collected: 07/27/24 1513    Lab Status: Preliminary result Specimen: Blood from Hand, Left Updated: 07/31/24 0101     Blood Culture No Growth at 72 hrs.    Blood culture #2 [[id number]] Collected: 07/27/24 1513    Lab Status: Preliminary result Specimen: Blood from Hand, Right Updated: 07/31/24 0101     Blood Culture No Growth at 72 hrs.    T4, free [[id number]]  (Normal) Collected: 07/28/24 0550    Lab Status: Final result Specimen: Blood from Arm, Right Updated: 07/28/24 1459     Free T4 1.01 ng/dL     Narrative:        \"Therapeutic range for patients medicated with thyroid disorders: 0.61-1.24 ng/dL.\"    Magnesium [[id number]]  (Normal) Collected: 07/28/24 0550    Lab Status: Final result Specimen: Blood from Arm, Right Updated: 07/28/24 0617     Magnesium 2.1 mg/dL     Comprehensive metabolic panel [[id number]]  (Abnormal) Collected: 07/28/24 0550    Lab Status: Final result Specimen: Blood from Arm, Right Updated: 07/28/24 0617     Sodium 136 mmol/L      Potassium 4.8 mmol/L      Chloride 109 mmol/L      CO2 20 mmol/L      ANION GAP 7 mmol/L      BUN 18 mg/dL      Creatinine 0.61 mg/dL      Glucose 91 mg/dL      Calcium 8.0 mg/dL      Corrected Calcium 8.6 mg/dL      AST 51 U/L      ALT 27 U/L      Alkaline Phosphatase 58 U/L      Total Protein 5.7 g/dL      Albumin 3.3 g/dL      Total Bilirubin 1.17 mg/dL      eGFR 102 ml/min/1.73sq m     Narrative:      National Kidney Disease Foundation guidelines for Chronic Kidney Disease (CKD):     Stage 1 with normal or high GFR " (GFR > 90 mL/min/1.73 square meters)    Stage 2 Mild CKD (GFR = 60-89 mL/min/1.73 square meters)    Stage 3A Moderate CKD (GFR = 45-59 mL/min/1.73 square meters)    Stage 3B Moderate CKD (GFR = 30-44 mL/min/1.73 square meters)    Stage 4 Severe CKD (GFR = 15-29 mL/min/1.73 square meters)    Stage 5 End Stage CKD (GFR <15 mL/min/1.73 square meters)  Note: GFR calculation is accurate only with a steady state creatinine    CBC and differential [[id number]]  (Abnormal) Collected: 07/28/24 0550    Lab Status: Final result Specimen: Blood from Arm, Right Updated: 07/28/24 0609     WBC 10.35 Thousand/uL      RBC 3.46 Million/uL      Hemoglobin 12.0 g/dL      Hematocrit 36.4 %       fL      MCH 34.7 pg      MCHC 33.0 g/dL      RDW 12.5 %      MPV 9.4 fL      Platelets 139 Thousands/uL      nRBC 0 /100 WBCs      Segmented % 67 %      Immature Grans % 0 %      Lymphocytes % 23 %      Monocytes % 9 %      Eosinophils Relative 1 %      Basophils Relative 0 %      Absolute Neutrophils 6.93 Thousands/µL      Absolute Immature Grans 0.03 Thousand/uL      Absolute Lymphocytes 2.40 Thousands/µL      Absolute Monocytes 0.90 Thousand/µL      Eosinophils Absolute 0.05 Thousand/µL      Basophils Absolute 0.04 Thousands/µL     Vitamin B12 [[id number]]  (Normal) Collected: 07/27/24 1253    Lab Status: Final result Specimen: Blood from Arm, Right Updated: 07/28/24 0022     Vitamin B-12 294 pg/mL     Folate [[id number]]  (Normal) Collected: 07/27/24 1253    Lab Status: Final result Specimen: Blood from Arm, Right Updated: 07/28/24 0022     Folate 14.5 ng/mL     Lactic acid 2 Hours [[id number]]  (Abnormal) Collected: 07/27/24 1614    Lab Status: Final result Specimen: Blood from Hand, Left Updated: 07/27/24 1700     LACTIC ACID 4.8 mmol/L     Narrative:      Result may be elevated if tourniquet was used during collection.    Basic metabolic panel [[id number]]  (Abnormal) Collected: 07/27/24 1614    Lab Status: Final result Specimen: Blood  from Hand, Left Updated: 07/27/24 1658     Sodium 142 mmol/L      Potassium 4.0 mmol/L      Chloride 105 mmol/L      CO2 17 mmol/L      ANION GAP 20 mmol/L      BUN 20 mg/dL      Creatinine 0.85 mg/dL      Glucose 75 mg/dL      Calcium 8.7 mg/dL      eGFR 89 ml/min/1.73sq m     Narrative:      National Kidney Disease Foundation guidelines for Chronic Kidney Disease (CKD):     Stage 1 with normal or high GFR (GFR > 90 mL/min/1.73 square meters)    Stage 2 Mild CKD (GFR = 60-89 mL/min/1.73 square meters)    Stage 3A Moderate CKD (GFR = 45-59 mL/min/1.73 square meters)    Stage 3B Moderate CKD (GFR = 30-44 mL/min/1.73 square meters)    Stage 4 Severe CKD (GFR = 15-29 mL/min/1.73 square meters)    Stage 5 End Stage CKD (GFR <15 mL/min/1.73 square meters)  Note: GFR calculation is accurate only with a steady state creatinine    Magnesium [[id number]]  (Normal) Collected: 07/27/24 1614    Lab Status: Final result Specimen: Blood from Hand, Left Updated: 07/27/24 1658     Magnesium 2.3 mg/dL     HS Troponin I 4hr [[id number]]  (Abnormal) Collected: 07/27/24 1614    Lab Status: Final result Specimen: Blood from Hand, Left Updated: 07/27/24 1645     hs TnI 4hr 342 ng/L      Delta 4hr hsTnI 314 ng/L     APTT [[id number]]  (Normal) Collected: 07/27/24 1614    Lab Status: Final result Specimen: Blood from Hand, Left Updated: 07/27/24 1633     PTT 24 seconds     Protime-INR [[id number]]  (Normal) Collected: 07/27/24 1614    Lab Status: Final result Specimen: Blood from Hand, Left Updated: 07/27/24 1633     Protime 13.2 seconds      INR 0.98    Lactic acid, plasma (w/reflex if result > 2.0) [[id number]]  (Abnormal) Collected: 07/27/24 1513    Lab Status: Final result Specimen: Blood from Arm, Left Updated: 07/27/24 1559     LACTIC ACID 10.1 mmol/L     Narrative:      Result may be elevated if tourniquet was used during collection.    HS Troponin I 2hr [[id number]]  (Abnormal) Collected: 07/27/24 151    Lab Status: Final result  Specimen: Blood from Hand, Right Updated: 07/27/24 1548     hs TnI 2hr 270 ng/L      Delta 2hr hsTnI 242 ng/L     FLU/RSV/COVID - if FLU/RSV clinically relevant [[id number]]  (Normal) Collected: 07/27/24 1450    Lab Status: Final result Specimen: Nares from Nose Updated: 07/27/24 1534     SARS-CoV-2 Negative     INFLUENZA A PCR Negative     INFLUENZA B PCR Negative     RSV PCR Negative    Narrative:      FOR PEDIATRIC PATIENTS - copy/paste COVID Guidelines URL to browser: [email or web address]    SARS-CoV-2 assay is a Nucleic Acid Amplification assay intended for the  qualitative detection of nucleic acid from SARS-CoV-2 in nasopharyngeal  swabs. Results are for the presumptive identification of SARS-CoV-2 RNA.    Positive results are indicative of infection with SARS-CoV-2, the virus  causing COVID-19, but do not rule out bacterial infection or co-infection  with other viruses. Laboratories within the [place] and its  territories are required to report all positive results to the appropriate  public health authorities. Negative results do not preclude SARS-CoV-2  infection and should not be used as the sole basis for treatment or other  patient management decisions. Negative results must be combined with  clinical observations, patient history, and epidemiological information.  This test has not been FDA cleared or approved.    This test has been authorized by FDA under an Emergency Use Authorization  (EUA). This test is only authorized for the duration of time the  declaration that circumstances exist justifying the authorization of the  emergency use of an in vitro diagnostic tests for detection of SARS-CoV-2  virus and/or diagnosis of COVID-19 infection under section 564(b)(1) of  the Act, 21 U.S.C. 360bbb-3(b)(1), unless the authorization is terminated  or revoked sooner. The test has been validated but independent review by FDA  and CLIA is  pending.    Test performed using [employer] GeneXpert: This RT-PCR assay targets N2,  a region unique to SARS-CoV-2. A conserved region in the E-gene was chosen  for pan-Sarbecovirus detection which includes SARS-CoV-2.    According to CMS-2020-01-R, this platform meets the definition of high-throughput technology.    D-dimer, quantitative [[id number]]  (Abnormal) Collected: 07/27/24 1255    Lab Status: Final result Specimen: Blood Updated: 07/27/24 1431     D-Dimer, Quant 0.57 ug/ml FEU     Narrative:      In the evaluation for possible pulmonary embolism, in the appropriate (Well's Score of 4 or less) patient, the age adjusted d-dimer cutoff for this patient can be calculated as:    Age x 0.01 (in ug/mL) for Age-adjusted D-dimer exclusion threshold for a patient over 50 years.    TSH [[id number]]  (Abnormal) Collected: 07/27/24 1255    Lab Status: Final result Specimen: Blood Updated: 07/27/24 1403     TSH 3RD GENERATON 0.379 uIU/mL     B-Type Natriuretic Peptide(BNP) [[id number]]  (Normal) Collected: 07/27/24 1253    Lab Status: Final result Specimen: Blood from Arm, Right Updated: 07/27/24 1334     BNP 44 pg/mL     HS Troponin 0hr (reflex protocol) [[id number]]  (Normal) Collected: 07/27/24 1255    Lab Status: Final result Specimen: Blood from Arm, Right Updated: 07/27/24 1327     hs TnI 0hr 28 ng/L     Comprehensive metabolic panel [[id number]]  (Abnormal) Collected: 07/27/24 1255    Lab Status: Final result Specimen: Blood from Arm, Right Updated: 07/27/24 1322     Sodium 141 mmol/L      Potassium 2.9 mmol/L      Chloride 102 mmol/L      CO2 14 mmol/L      ANION GAP 25 mmol/L      BUN 18 mg/dL      Creatinine 0.76 mg/dL      Glucose 67 mg/dL      Calcium 9.2 mg/dL      AST 57 U/L      ALT 35 U/L      Alkaline Phosphatase 75 U/L      Total Protein 7.0 g/dL      Albumin 4.1 g/dL      Total Bilirubin 1.05 mg/dL      eGFR 93 ml/min/1.73sq m     Narrative:      National Kidney Disease Foundation guidelines for Chronic  Kidney Disease (CKD):     Stage 1 with normal or high GFR (GFR > 90 mL/min/1.73 square meters)    Stage 2 Mild CKD (GFR = 60-89 mL/min/1.73 square meters)    Stage 3A Moderate CKD (GFR = 45-59 mL/min/1.73 square meters)    Stage 3B Moderate CKD (GFR = 30-44 mL/min/1.73 square meters)    Stage 4 Severe CKD (GFR = 15-29 mL/min/1.73 square meters)    Stage 5 End Stage CKD (GFR <15 mL/min/1.73 square meters)  Note: GFR calculation is accurate only with a steady state creatinine    Magnesium [[id number]]  (Abnormal) Collected: 07/27/24 1255    Lab Status: Final result Specimen: Blood from Arm, Right Updated: 07/27/24 1322     Magnesium 1.6 mg/dL     Lipase [[id number]]  (Normal) Collected: 07/27/24 1255    Lab Status: Final result Specimen: Blood from Arm, Right Updated: 07/27/24 1322     Lipase 33 u/L     Ethanol [[id number]]  (Abnormal) Collected: 07/27/24 1253    Lab Status: Final result Specimen: Blood from Arm, Right Updated: 07/27/24 1322     Ethanol Lvl 60 mg/dL     Protime-INR [[id number]]  (Normal) Collected: 07/27/24 1255    Lab Status: Final result Specimen: Blood from Arm, Right Updated: 07/27/24 1315     Protime 13.3 seconds      INR 0.99    APTT [[id number]]  (Normal) Collected: 07/27/24 1255    Lab Status: Final result Specimen: Blood from Arm, Right Updated: 07/27/24 1315     PTT 24 seconds     CBC and differential [[id number]]  (Abnormal) Collected: 07/27/24 1253    Lab Status: Final result Specimen: Blood from Arm, Right Updated: 07/27/24 1303     WBC 18.82 Thousand/uL      RBC 4.19 Million/uL      Hemoglobin 14.6 g/dL      Hematocrit 42.7 %       fL      MCH 34.8 pg      MCHC 34.2 g/dL      RDW 12.4 %      MPV 9.3 fL      Platelets 208 Thousands/uL      nRBC 0 /100 WBCs      Segmented % 57 %      Immature Grans % 1 %      Lymphocytes % 33 %      Monocytes % 7 %      Eosinophils Relative 1 %      Basophils Relative 1 %      Absolute Neutrophils 11.02 Thousands/µL      Absolute Immature Grans 0.10  Thousand/uL      Absolute Lymphocytes 6.12 Thousands/µL      Absolute Monocytes 1.32 Thousand/µL      Eosinophils Absolute 0.17 Thousand/µL      Basophils Absolute 0.09 Thousands/µL                    XR chest 1 view portable   Final Result by Marlys Abraham MD (07/27 2003)      No acute cardiopulmonary disease.            Workstation performed: [id number]                    Procedures  ECG 12 Lead Documentation Only    Date/Time: 7/27/2024 12:55 PM    Performed by: Cecilia Haynes DO  Authorized by: Cecilia Haynes DO    ECG reviewed by me, the ED Provider: yes    Patient location:  ED  Previous ECG:     Previous ECG:  Unavailable    Comparison to cardiac monitor: Yes    Interpretation:     Interpretation: abnormal    Rate:     ECG rate assessment: tachycardic    Rhythm:     Rhythm: sinus rhythm and atrial fibrillation    Ectopy:     Ectopy: none    QRS:     QRS axis:  Normal  Conduction:     Conduction: normal    ST segments:     ST segments:  Non-specific  T waves:     T waves: non-specific    CriticalCare Time    Date/Time: 7/27/2024 7:41 AM    Performed by: Cecilia Haynes DO  Authorized by: Cecilia Haynes DO    Critical care provider statement:     Critical care time (minutes):  45    Critical care time was exclusive of:  Separately billable procedures and treating other patients    Critical care was necessary to treat or prevent imminent or life-threatening deterioration of the following conditions:  Cardiac failure    Critical care was time spent personally by me on the following activities:  Blood draw for specimens, obtaining history from patient or surrogate, development of treatment plan with patient or surrogate, discussions with consultants, evaluation of patient's response to treatment, examination of patient, interpretation of cardiac output measurements, ordering and performing treatments and interventions, ordering and review of laboratory studies, ordering and review of radiographic studies,  re-evaluation of patient's condition and review of old charts    I assumed direction of critical care for this patient from another provider in my specialty: no             ED Course            CIWA-Ar Score       Row Name 07/29/24 12:23:23 07/29/24 0700 07/29/24 0430       CIWA-Ar    Nausea and Vomiting 0 0 0    Tactile Disturbances 0 0 0    Tremor 1 1 0    Auditory Disturbances 0 0 0    Paroxysmal Sweats 0 0 0    Visual Disturbances 0 0 0    Anxiety 0 0 0    Headache, Fullness in Head 0 1 0    Agitation 0 0 0    Orientation and Clouding of Sensorium 0 0 0    CIWA-Ar Total 1 2 0      Row Name 07/29/24 0200 07/29/24 00:34:30 07/28/24 22:08:35       CIWA-Ar    Nausea and Vomiting 0 0 0    Tactile Disturbances 0 0 0    Tremor 0 1 0    Auditory Disturbances 0 0 0    Paroxysmal Sweats 0 2 0    Visual Disturbances 0 0 0    Anxiety 0 4 0    Headache, Fullness in Head 0 2 0    Agitation 0 1 0    Orientation and Clouding of Sensorium 0 0 0    CIWA-Ar Total 0 10 0      Row Name 07/28/24 1943 07/28/24 1600 07/28/24 1200       CIWA-Ar    Nausea and Vomiting 0 0 0    Tactile Disturbances 0 0 0    Tremor 0 0 0    Auditory Disturbances 0 0 0    Paroxysmal Sweats 0 0 0    Visual Disturbances 0 0 0    Anxiety 0 0 0    Headache, Fullness in Head 2 0 0    Agitation 0 0 0    Orientation and Clouding of Sensorium 0 0 0    CIWA-Ar Total 2 0 0      Row Name 07/28/24 0840             CIWA-Ar    Nausea and Vomiting 0      Tactile Disturbances 0      Tremor 3      Auditory Disturbances 0      Paroxysmal Sweats 1      Visual Disturbances 0      Anxiety 0      Headache, Fullness in Head 2      Agitation 0      Orientation and Clouding of Sensorium 0      CIWA-Ar Total 6                     CIWA-Ar Score       Row Name 07/29/24 12:23:23 07/29/24 0700 07/29/24 0430       CIWA-Ar    Nausea and Vomiting 0 0 0    Tactile Disturbances 0 0 0    Tremor 1 1 0    Auditory Disturbances 0 0 0    Paroxysmal Sweats 0 0 0    Visual Disturbances 0 0 0     Anxiety 0 0 0    Headache, Fullness in Head 0 1 0    Agitation 0 0 0    Orientation and Clouding of Sensorium 0 0 0    CIWA-Ar Total 1 2 0      Row Name 07/29/24 0200 07/29/24 00:34:30 07/28/24 22:08:35       CIWA-Ar    Nausea and Vomiting 0 0 0    Tactile Disturbances 0 0 0    Tremor 0 1 0    Auditory Disturbances 0 0 0    Paroxysmal Sweats 0 2 0    Visual Disturbances 0 0 0    Anxiety 0 4 0    Headache, Fullness in Head 0 2 0    Agitation 0 1 0    Orientation and Clouding of Sensorium 0 0 0    CIWA-Ar Total 0 10 0      Row Name 07/28/24 1943 07/28/24 1600 07/28/24 1200       CIWA-Ar    Nausea and Vomiting 0 0 0    Tactile Disturbances 0 0 0    Tremor 0 0 0    Auditory Disturbances 0 0 0    Paroxysmal Sweats 0 0 0    Visual Disturbances 0 0 0    Anxiety 0 0 0    Headache, Fullness in Head 2 0 0    Agitation 0 0 0    Orientation and Clouding of Sensorium 0 0 0    CIWA-Ar Total 2 0 0      Row Name 07/28/24 0840             CIWA-Ar    Nausea and Vomiting 0      Tactile Disturbances 0      Tremor 3      Auditory Disturbances 0      Paroxysmal Sweats 1      Visual Disturbances 0      Anxiety 0      Headache, Fullness in Head 2      Agitation 0      Orientation and Clouding of Sensorium 0      CIWA-Ar Total 6                     CIWA-Ar Score       Row Name 07/29/24 12:23:23 07/29/24 0700 07/29/24 0430       CIWA-Ar    Nausea and Vomiting 0 0 0    Tactile Disturbances 0 0 0    Tremor 1 1 0    Auditory Disturbances 0 0 0    Paroxysmal Sweats 0 0 0    Visual Disturbances 0 0 0    Anxiety 0 0 0    Headache, Fullness in Head 0 1 0    Agitation 0 0 0    Orientation and Clouding of Sensorium 0 0 0    CIWA-Ar Total 1 2 0      Row Name 07/29/24 0200 07/29/24 00:34:30 07/28/24 22:08:35       CIWA-Ar    Nausea and Vomiting 0 0 0    Tactile Disturbances 0 0 0    Tremor 0 1 0    Auditory Disturbances 0 0 0    Paroxysmal Sweats 0 2 0    Visual Disturbances 0 0 0    Anxiety 0 4 0    Headache, Fullness in Head 0 2 0    Agitation 0 1 0     Orientation and Clouding of Sensorium 0 0 0    CIWA-Ar Total 0 10 0      Row Name 07/28/24 1943 07/28/24 1600 07/28/24 1200       CIWA-Ar    Nausea and Vomiting 0 0 0    Tactile Disturbances 0 0 0    Tremor 0 0 0    Auditory Disturbances 0 0 0    Paroxysmal Sweats 0 0 0    Visual Disturbances 0 0 0    Anxiety 0 0 0    Headache, Fullness in Head 2 0 0    Agitation 0 0 0    Orientation and Clouding of Sensorium 0 0 0    CIWA-Ar Total 2 0 0      Row Name 07/28/24 0840             CIWA-Ar    Nausea and Vomiting 0      Tactile Disturbances 0      Tremor 3      Auditory Disturbances 0      Paroxysmal Sweats 1      Visual Disturbances 0      Anxiety 0      Headache, Fullness in Head 2      Agitation 0      Orientation and Clouding of Sensorium 0      CIWA-Ar Total 6                     CIWA-Ar Score       Row Name 07/29/24 12:23:23 07/29/24 0700 07/29/24 0430       CIWA-Ar    Nausea and Vomiting 0 0 0    Tactile Disturbances 0 0 0    Tremor 1 1 0    Auditory Disturbances 0 0 0    Paroxysmal Sweats 0 0 0    Visual Disturbances 0 0 0    Anxiety 0 0 0    Headache, Fullness in Head 0 1 0    Agitation 0 0 0    Orientation and Clouding of Sensorium 0 0 0    CIWA-Ar Total 1 2 0      Row Name 07/29/24 0200 07/29/24 00:34:30 07/28/24 22:08:35       CIWA-Ar    Nausea and Vomiting 0 0 0    Tactile Disturbances 0 0 0    Tremor 0 1 0    Auditory Disturbances 0 0 0    Paroxysmal Sweats 0 2 0    Visual Disturbances 0 0 0    Anxiety 0 4 0    Headache, Fullness in Head 0 2 0    Agitation 0 1 0    Orientation and Clouding of Sensorium 0 0 0    CIWA-Ar Total 0 10 0      Row Name 07/28/24 1943 07/28/24 1600 07/28/24 1200       CIWA-Ar    Nausea and Vomiting 0 0 0    Tactile Disturbances 0 0 0    Tremor 0 0 0    Auditory Disturbances 0 0 0    Paroxysmal Sweats 0 0 0    Visual Disturbances 0 0 0    Anxiety 0 0 0    Headache, Fullness in Head 2 0 0    Agitation 0 0 0    Orientation and Clouding of Sensorium 0 0 0    CIWA-Ar Total 2 0 0       [facility] Name 07/28/24 0840             CIWA-Ar    Nausea and Vomiting 0      Tactile Disturbances 0      Tremor 3      Auditory Disturbances 0      Paroxysmal Sweats 1      Visual Disturbances 0      Anxiety 0      Headache, Fullness in Head 2      Agitation 0      Orientation and Clouding of Sensorium 0      CIWA-Ar Total 6                     CIWA-Ar Score       [facility] Name 07/29/24 12:23:23 07/29/24 0700 07/29/24 0430       CIWA-Ar    Nausea and Vomiting 0 0 0    Tactile Disturbances 0 0 0    Tremor 1 1 0    Auditory Disturbances 0 0 0    Paroxysmal Sweats 0 0 0    Visual Disturbances 0 0 0    Anxiety 0 0 0    Headache, Fullness in Head 0 1 0    Agitation 0 0 0    Orientation and Clouding of Sensorium 0 0 0    CIWA-Ar Total 1 2 0      [facility] Name 07/29/24 0200 07/29/24 00:34:30 07/28/24 22:08:35       CIWA-Ar    Nausea and Vomiting 0 0 0    Tactile Disturbances 0 0 0    Tremor 0 1 0    Auditory Disturbances 0 0 0    Paroxysmal Sweats 0 2 0    Visual Disturbances 0 0 0    Anxiety 0 4 0    Headache, Fullness in Head 0 2 0    Agitation 0 1 0    Orientation and Clouding of Sensorium 0 0 0    CIWA-Ar Total 0 10 0      Row Name 07/28/24 1943 07/28/24 1600 07/28/24 1200       CIWA-Ar    Nausea and Vomiting 0 0 0    Tactile Disturbances 0 0 0    Tremor 0 0 0    Auditory Disturbances 0 0 0    Paroxysmal Sweats 0 0 0    Visual Disturbances 0 0 0    Anxiety 0 0 0    Headache, Fullness in Head 2 0 0    Agitation 0 0 0    Orientation and Clouding of Sensorium 0 0 0    CIWA-Ar Total 2 0 0      Row Name 07/28/24 0840             CIWA-Ar    Nausea and Vomiting 0      Tactile Disturbances 0      Tremor 3      Auditory Disturbances 0      Paroxysmal Sweats 1      Visual Disturbances 0      Anxiety 0      Headache, Fullness in Head 2      Agitation 0      Orientation and Clouding of Sensorium 0      CIWA-Ar Total 6                     CIWA-Ar Score       Row Name 07/29/24 12:23:23 07/29/24 0700 07/29/24 0430       CIWA-Ar    Nausea and  Vomiting 0 0 0    Tactile Disturbances 0 0 0    Tremor 1 1 0    Auditory Disturbances 0 0 0    Paroxysmal Sweats 0 0 0    Visual Disturbances 0 0 0    Anxiety 0 0 0    Headache, Fullness in Head 0 1 0    Agitation 0 0 0    Orientation and Clouding of Sensorium 0 0 0    CIWA-Ar Total 1 2 0      Row Name 07/29/24 0200 07/29/24 00:34:30 07/28/24 22:08:35       CIWA-Ar    Nausea and Vomiting 0 0 0    Tactile Disturbances 0 0 0    Tremor 0 1 0    Auditory Disturbances 0 0 0    Paroxysmal Sweats 0 2 0    Visual Disturbances 0 0 0    Anxiety 0 4 0    Headache, Fullness in Head 0 2 0    Agitation 0 1 0    Orientation and Clouding of Sensorium 0 0 0    CIWA-Ar Total 0 10 0      Row Name 07/28/24 1943 07/28/24 1600 07/28/24 1200       CIWA-Ar    Nausea and Vomiting 0 0 0    Tactile Disturbances 0 0 0    Tremor 0 0 0    Auditory Disturbances 0 0 0    Paroxysmal Sweats 0 0 0    Visual Disturbances 0 0 0    Anxiety 0 0 0    Headache, Fullness in Head 2 0 0    Agitation 0 0 0    Orientation and Clouding of Sensorium 0 0 0    CIWA-Ar Total 2 0 0      Row Name 07/28/24 0840             CIWA-Ar    Nausea and Vomiting 0      Tactile Disturbances 0      Tremor 3      Auditory Disturbances 0      Paroxysmal Sweats 1      Visual Disturbances 0      Anxiety 0      Headache, Fullness in Head 2      Agitation 0      Orientation and Clouding of Sensorium 0      CIWA-Ar Total 6                     CIWA-Ar Score       Row Name 07/29/24 12:23:23 07/29/24 0700 07/29/24 0430       CIWA-Ar    Nausea and Vomiting 0 0 0    Tactile Disturbances 0 0 0    Tremor 1 1 0    Auditory Disturbances 0 0 0    Paroxysmal Sweats 0 0 0    Visual Disturbances 0 0 0    Anxiety 0 0 0    Headache, Fullness in Head 0 1 0    Agitation 0 0 0    Orientation and Clouding of Sensorium 0 0 0    CIWA-Ar Total 1 2 0      Row Name 07/29/24 0200 07/29/24 00:34:30 07/28/24 22:08:35       CIWA-Ar    Nausea and Vomiting 0 0 0    Tactile Disturbances 0 0 0    Tremor 0 1 0     Auditory Disturbances 0 0 0    Paroxysmal Sweats 0 2 0    Visual Disturbances 0 0 0    Anxiety 0 4 0    Headache, Fullness in Head 0 2 0    Agitation 0 1 0    Orientation and Clouding of Sensorium 0 0 0    CIWA-Ar Total 0 10 0      Row Name 07/28/24 1943 07/28/24 1600 07/28/24 1200       CIWA-Ar    Nausea and Vomiting 0 0 0    Tactile Disturbances 0 0 0    Tremor 0 0 0    Auditory Disturbances 0 0 0    Paroxysmal Sweats 0 0 0    Visual Disturbances 0 0 0    Anxiety 0 0 0    Headache, Fullness in Head 2 0 0    Agitation 0 0 0    Orientation and Clouding of Sensorium 0 0 0    CIWA-Ar Total 2 0 0      Row Name 07/28/24 0840             CIWA-Ar    Nausea and Vomiting 0      Tactile Disturbances 0      Tremor 3      Auditory Disturbances 0      Paroxysmal Sweats 1      Visual Disturbances 0      Anxiety 0      Headache, Fullness in Head 2      Agitation 0      Orientation and Clouding of Sensorium 0      CIWA-Ar Total 6                     CIWA-Ar Score       Row Name 07/29/24 12:23:23 07/29/24 0700 07/29/24 0430       CIWA-Ar    Nausea and Vomiting 0 0 0    Tactile Disturbances 0 0 0    Tremor 1 1 0    Auditory Disturbances 0 0 0    Paroxysmal Sweats 0 0 0    Visual Disturbances 0 0 0    Anxiety 0 0 0    Headache, Fullness in Head 0 1 0    Agitation 0 0 0    Orientation and Clouding of Sensorium 0 0 0    CIWA-Ar Total 1 2 0      Row Name 07/29/24 0200 07/29/24 00:34:30 07/28/24 22:08:35       CIWA-Ar    Nausea and Vomiting 0 0 0    Tactile Disturbances 0 0 0    Tremor 0 1 0    Auditory Disturbances 0 0 0    Paroxysmal Sweats 0 2 0    Visual Disturbances 0 0 0    Anxiety 0 4 0    Headache, Fullness in Head 0 2 0    Agitation 0 1 0    Orientation and Clouding of Sensorium 0 0 0    CIWA-Ar Total 0 10 0      Row Name 07/28/24 1943 07/28/24 1600 07/28/24 1200       CIWA-Ar    Nausea and Vomiting 0 0 0    Tactile Disturbances 0 0 0    Tremor 0 0 0    Auditory Disturbances 0 0 0    Paroxysmal Sweats 0 0 0    Visual  Disturbances 0 0 0    Anxiety 0 0 0    Headache, Fullness in Head 2 0 0    Agitation 0 0 0    Orientation and Clouding of Sensorium 0 0 0    CIWA-Ar Total 2 0 0      Row Name 07/28/24 0840             CIWA-Ar    Nausea and Vomiting 0      Tactile Disturbances 0      Tremor 3      Auditory Disturbances 0      Paroxysmal Sweats 1      Visual Disturbances 0      Anxiety 0      Headache, Fullness in Head 2      Agitation 0      Orientation and Clouding of Sensorium 0      CIWA-Ar Total 6                                                      Medical Decision Making  Patient evaluated with EKG labs imaging given diltiazem his blood pressure worsened so we gave him some fluids. His BP improved, repleted his electrolytes admitted to the hospitalist service for further evaluation management ICU consulted they evaluated the patient saying the patient is stable for the floor    Problems Addressed:  ACS (acute coronary syndrome) (HCC): acute illness or injury  Alcohol withdrawal (HCC): acute illness or injury  Atrial fibrillation (HCC): acute illness or injury    Amount and/or Complexity of Data Reviewed  External Data Reviewed: notes.  Labs: ordered. Decision-making details documented in ED Course.  Radiology: ordered and independent interpretation performed. Decision-making details documented in ED Course.     Details: No acute disease  ECG/medicine tests: ordered and independent interpretation performed. Decision-making details documented in ED Course.    Risk  Prescription drug management.  Decision regarding hospitalization.                 Disposition  Final diagnoses:   Atrial fibrillation (HCC)   Alcohol withdrawal (HCC)   ACS (acute coronary syndrome) (HCC)     Time reflects when diagnosis was documented in both MDM as applicable and the Disposition within this note       Time User Action Codes Description Comment    7/27/2024  3:05 PM Cecilia Haynes Add [I48.91] Atrial fibrillation (HCC)     7/27/2024  3:05 PM [place]  Cecilia Add [F10.939] Alcohol withdrawal (HCC)     7/27/2024  3:05 PM Cecilia Haynes Add [I24.9] ACS (acute coronary syndrome) (HCC)     7/29/2024  4:53 PM Michael Ivey Add [F10.10] Alcohol abuse     7/29/2024  4:53 PM Michael Ivey Add [Z72.0] Tobacco abuse           ED Disposition       ED Disposition   Admit    Condition   Stable    Date/Time   Sat Jul 27, 2024  3:05 PM    Comment                   Follow-up Information    None         Discharge Medication List as of 7/29/2024  4:55 PM        START taking these medications    Details   cyanocobalamin (VITAMIN B-12) 500 MCG tablet Take 1 tablet (500 mcg total) by mouth daily, Starting Tue 7/30/2024, Until Thu 8/29/2024, Normal      folic acid (FOLVITE) 800 MCG tablet Take 1 tablet (800 mcg total) by mouth daily, Starting Tue 7/30/2024, Until Thu 8/29/2024, Normal      nicotine (NICODERM CQ) 21 mg/24 hr TD 24 hr patch Place 1 patch on the skin over 24 hours daily, Starting Tue 7/30/2024, Normal      thiamine 100 MG tablet Take 1 tablet (100 mg total) by mouth daily for 5 days, Starting Tue 7/30/2024, Until Sun 8/4/2024, Normal           CONTINUE these medications which have CHANGED    Details   metoprolol tartrate (LOPRESSOR) 25 mg tablet Take 0.5 tablets (12.5 mg total) by mouth every 12 (twelve) hours, Starting Mon 7/29/2024, Until Wed 8/28/2024, Normal           CONTINUE these medications which have NOT CHANGED    Details   Ascorbic Acid (vitamin C) 1000 MG tablet Take 1,000 mg by mouth daily, Historical Med      aspirin 81 mg chewable tablet Chew 81 mg daily, Historical Med      clopidogrel (PLAVIX) 75 mg tablet Take 75 mg by mouth daily, Historical Med      Evolocumab (Repatha SureClick) 140 MG/ML SOAJ Inject 140 mg under the skin every 14 (fourteen) days, Historical Med      FLUoxetine (PROzac) 20 mg capsule Take 20 mg by mouth daily, Historical Med      lansoprazole (PREVACID) 30 mg capsule Take 30 mg by mouth daily, Historical Med      losartan  (COZAAR) 25 mg tablet Take 25 mg by mouth daily, Historical Med             No discharge procedures on file.    PDMP Review         Value Time User    PDMP Reviewed  Yes 7/29/2024  4:49 PM ANANDA Ureña            ED Provider  Electronically Signed by             Cecilia Haynes, DO  07/28/24 0742       Cecilia Haynes, DO  07/31/24 0703       Cecilia Haynes, DO  07/31/24 0704

## 2024-07-27 NOTE — ED NOTES
Pt reported that he drink daily started at noon, last drink last night, daughter told him to take sips of alcohol this morning. Pt is awake oriented, hyperventilate, co of nausea and diaphoresis. Call bell within reach.      Tiffani Truong RN  07/27/24 2438

## 2024-07-27 NOTE — ASSESSMENT & PLAN NOTE
Significant history of alcohol abuse, 15+ drinks on Friday  Placed on CIWA protocol. Required IV ativan overnight 7/27-7/28 for withdrawal symptoms  Folic acid, thiamine, multivitamin supplementation  Case management consulted for ETOH resources

## 2024-07-27 NOTE — ASSESSMENT & PLAN NOTE
Patient presents with new onset A-fib with RVR likely due to alcohol abuse  Vitals improved with Cardizem x 1, IV fluids, stable on admission  Mildly elevated D-dimer, age adjusted to WNL. Hold off on imaging, low suspicion given principal problem  Continue Lopressor 12.5 mg twice daily  Heparin gtt anticoagulation. Price check Eliquis $47/month  Troponin level: 0hr 28, 2hr 270, 4hr 342  Obtain echo  Monitor on telemetry  Consult to Cardiology, recommendations are appreciated

## 2024-07-27 NOTE — ASSESSMENT & PLAN NOTE
S/p stenting 2020 with outside facility, POA no shortness of breath, no chest pain, no palpitations  Continue home beta-blocker therapy, ARB, currently on home Aspirin/Plavix  Heparin gtt due to new onset A-fib  Continue aspirin daily, will hold Plavix given elevated risk of bleeding on triple therapy  Appreciate cardiology evaluation

## 2024-07-27 NOTE — PLAN OF CARE
Problem: Potential for Falls  Goal: Patient will remain free of falls  Description: INTERVENTIONS:  - Educate patient/family on patient safety including physical limitations  - Instruct patient to call for assistance with activity   - Consult OT/PT to assist with strengthening/mobility   - Keep Call bell within reach  - Keep bed low and locked with side rails adjusted as appropriate  - Keep care items and personal belongings within reach  - Initiate and maintain comfort rounds  - Make Fall Risk Sign visible to staff  - Initiate/Maintain bed alarm  - Apply yellow socks and bracelet for high fall risk patients  - Consider moving patient to room near nurses station  Outcome: Progressing     Problem: PAIN - ADULT  Goal: Verbalizes/displays adequate comfort level or baseline comfort level  Description: Interventions:  - Encourage patient to monitor pain and request assistance  - Assess pain using appropriate pain scale  - Administer analgesics based on type and severity of pain and evaluate response  - Implement non-pharmacological measures as appropriate and evaluate response  - Consider cultural and social influences on pain and pain management  - Notify physician/advanced practitioner if interventions unsuccessful or patient reports new pain  Outcome: Progressing     Problem: INFECTION - ADULT  Goal: Absence or prevention of progression during hospitalization  Description: INTERVENTIONS:  - Assess and monitor for signs and symptoms of infection  - Monitor lab/diagnostic results  - Monitor all insertion sites, i.e. indwelling lines, tubes, and drains  - Carpio appropriate cooling/warming therapies per order  - Administer medications as ordered  - Instruct and encourage patient and family to use good hand hygiene technique  - Identify and instruct in appropriate isolation precautions for identified infection/condition  Outcome: Progressing     Problem: SAFETY ADULT  Goal: Patient will remain free of  falls  Description: INTERVENTIONS:  - Educate patient/family on patient safety including physical limitations  - Instruct patient to call for assistance with activity   - Consult OT/PT to assist with strengthening/mobility   - Keep Call bell within reach  - Keep bed low and locked with side rails adjusted as appropriate  - Keep care items and personal belongings within reach  - Initiate and maintain comfort rounds  - Make Fall Risk Sign visible to staff  - Initiate/Maintain bed alarm  - Apply yellow socks and bracelet for high fall risk patients  - Consider moving patient to room near nurses station  Outcome: Progressing  Goal: Maintain or return to baseline ADL function  Description: INTERVENTIONS:  -  Assess patient's ability to carry out ADLs; assess patient's baseline for ADL function and identify physical deficits which impact ability to perform ADLs (bathing, care of mouth/teeth, toileting, grooming, dressing, etc.)  - Assess/evaluate cause of self-care deficits   - Assess range of motion  - Assess patient's mobility; develop plan if impaired  - Assess patient's need for assistive devices and provide as appropriate  - Encourage maximum independence but intervene and supervise when necessary  - Involve family in performance of ADLs  - Assess for home care needs following discharge   - Consider OT consult to assist with ADL evaluation and planning for discharge  - Provide patient education as appropriate  Outcome: Progressing     Problem: DISCHARGE PLANNING  Goal: Discharge to home or other facility with appropriate resources  Description: INTERVENTIONS:  - Identify barriers to discharge w/patient and caregiver  - Arrange for needed discharge resources and transportation as appropriate  - Identify discharge learning needs (meds, wound care, etc.)  - Arrange for interpretive services to assist at discharge as needed  - Refer to Case Management Department for coordinating discharge planning if the patient needs  post-hospital services based on physician/advanced practitioner order or complex needs related to functional status, cognitive ability, or social support system  Outcome: Progressing     Problem: Knowledge Deficit  Goal: Patient/family/caregiver demonstrates understanding of disease process, treatment plan, medications, and discharge instructions  Description: Complete learning assessment and assess knowledge base.  Interventions:  - Provide teaching at level of understanding  - Provide teaching via preferred learning methods  Outcome: Progressing     Problem: NEUROSENSORY - ADULT  Goal: Achieves stable or improved neurological status  Description: INTERVENTIONS  - Monitor and report changes in neurological status  - Monitor vital signs such as temperature, blood pressure, glucose, and any other labs ordered   - Initiate measures to prevent increased intracranial pressure  - Monitor for seizure activity and implement precautions if appropriate      Outcome: Progressing  Goal: Remains free of injury related to seizures activity  Description: INTERVENTIONS  - Maintain airway, patient safety  and administer oxygen as ordered  - Monitor patient for seizure activity, document and report duration and description of seizure to physician/advanced practitioner  - If seizure occurs,  ensure patient safety during seizure  - Reorient patient post seizure  - Seizure pads on all 4 side rails  - Instruct patient/family to notify RN of any seizure activity including if an aura is experienced  - Instruct patient/family to call for assistance with activity based on nursing assessment  - Administer anti-seizure medications if ordered    Outcome: Progressing     Problem: CARDIOVASCULAR - ADULT  Goal: Maintains optimal cardiac output and hemodynamic stability  Description: INTERVENTIONS:  - Monitor I/O, vital signs and rhythm  - Monitor for S/S and trends of decreased cardiac output  - Administer and titrate ordered vasoactive  medications to optimize hemodynamic stability  - Assess quality of pulses, skin color and temperature  - Assess for signs of decreased coronary artery perfusion  - Instruct patient to report change in severity of symptoms  Outcome: Progressing  Goal: Absence of cardiac dysrhythmias or at baseline rhythm  Description: INTERVENTIONS:  - Continuous cardiac monitoring, vital signs, obtain 12 lead EKG if ordered  - Administer antiarrhythmic and heart rate control medications as ordered  - Monitor electrolytes and administer replacement therapy as ordered  Outcome: Progressing     Problem: METABOLIC, FLUID AND ELECTROLYTES - ADULT  Goal: Electrolytes maintained within normal limits  Description: INTERVENTIONS:  - Monitor labs and assess patient for signs and symptoms of electrolyte imbalances  - Administer electrolyte replacement as ordered  - Monitor response to electrolyte replacements, including repeat lab results as appropriate  - Instruct patient on fluid and nutrition as appropriate  Outcome: Progressing  Goal: Fluid balance maintained  Description: INTERVENTIONS:  - Monitor labs   - Monitor I/O and WT  - Instruct patient on fluid and nutrition as appropriate  - Assess for signs & symptoms of volume excess or deficit  Outcome: Progressing

## 2024-07-27 NOTE — H&P
INTERNAL MEDICINE ADMISSION H&P     Name: Chalo Ahuja   Age & Sex: 68 y.o. male   MRN: [id number]  Unit/Bed#: [address] [place] [address]   Encounter: [id number]  Primary Care Provider: No primary care provider on file.    Code Status: Level 1 - Full Code  Admission Status: INPATIENT   Disposition: Patient requires Level 2 Step Down     ASSESSMENT/PLAN     Principal Problem:    New onset atrial fibrillation (HCC)  Active Problems:    Alcohol abuse    CAD in native artery    Leukocytosis    Depression    Gastroesophageal reflux disease without esophagitis    Mixed hyperlipidemia    S/P drug eluting coronary stent placement    Vitamin D deficiency    Electrolyte abnormality    Acute metabolic acidosis      * New onset atrial fibrillation (HCC)  Assessment & Plan  Patient presents with new onset A-fib with RVR likely due to alcohol abuse  Vitals improved with Cardizem x 1, IV fluids, stable on admission  Mildly elevated D-dimer, hold off on imaging, low suspicion given principal problem    Restart Lopressor 12.5 mg twice daily  Heparin gtt anticoagulation  Follow troponin trend  Lopressor IV for heart rate >160  Appreciate cardiology support, will likely need Eliquis or Xarelto on discharge    Alcohol abuse  Assessment & Plan  Significant history of alcohol abuse, 15+ drinks last night  CIWA protocol  Monitor vitals closely  Vitamin supplementation ordered  Case management follow-up for rehab facility    CAD in native artery  Assessment & Plan  S/p stenting 2020 with outside facility, POA no shortness of breath, no chest pain, no palpitations  Continue home beta-blocker therapy, ARB, currently on home Aspirin/Plavix  Heparin gtt due to new onset A-fib  Continue aspirin daily, will hold Plavix given elevated risk of bleeding on triple therapy  Appreciate cardiology evaluation    Leukocytosis  Assessment & Plan  Leukocytosis likely reactive, patient notes has been elevated previously  No signs of infection, for completeness  lactic, blood cultures pending    Acute metabolic acidosis  Assessment & Plan  Elevated lactic acidosis in the setting of hypotension due to A-fib with RVR, dehydration due to alcohol abuse  Do not feel this is due to infection, monitor  Continue aggressive IV fluid resuscitation/hydration  Continue to trend lactic to appropriate levels  Repeat blood work    Electrolyte abnormality  Assessment & Plan  Continue to monitor electrolytes, K >4, Mg >2    Vitamin D deficiency  Assessment & Plan  OTC vitamin D supplementation 2000 IU daily    Mixed hyperlipidemia  Assessment & Plan  Patient appears to be on Repatha with cardiology outpatient    Gastroesophageal reflux disease without esophagitis  Assessment & Plan  Continue PPI therapy    Depression  Assessment & Plan  Continue Prozac daily, recent loss of mother        VTE Prophylaxis: Heparin Drip  / sequential compression device   Code Status: Level 1 CODE STATUS  Discussion with family: Discussed with family at bedside    Anticipated Length of Stay:  Patient will be admitted on an Inpatient basis with an anticipated length of stay of  > 2 midnights.   Justification for Hospital Stay: A-fib with RVR, alcoholic withdrawal    Total Time for Visit, including Counseling / Coordination of Care: 90 minutes.  Greater than 50% of this total time spent on direct patient counseling and coordination of care.    Chief Complaint:   A-fib with RVR, alcoholic withdrawal    History of Present Illness:    Patient is a pleasant 68-year-old male presenting secondary to substernal chest pain.  Notable history of alcohol abuse, recent increase in drinking due to passing of mother.    Patient was found to be in A-fib with RVR, Cardizem bolus 15 mg improvement heart rate and blood pressure.  Patient also received IV fluid bolus.    On admission SBP greater than 120, heart rate lower than 120.    Patient admitted to medical floor, stepdown level 2.    Patient notes no significant chest pain or  shortness of breath on admission.    Medications reviewed, medical history reviewed.    Review of Systems:    Review of Systems   Constitutional:  Positive for fatigue. Negative for chills and fever.   HENT:  Negative for congestion and sore throat.    Eyes:  Negative for pain and visual disturbance.   Respiratory:  Negative for shortness of breath and wheezing.    Cardiovascular:  Positive for chest pain. Negative for palpitations.   Gastrointestinal:  Negative for abdominal pain, constipation, diarrhea, nausea and vomiting.   Genitourinary:  Negative for dysuria and frequency.   Musculoskeletal:  Negative for back pain and neck pain.   Skin:  Negative for color change and rash.   Neurological:  Negative for dizziness and headaches.   Psychiatric/Behavioral:  Negative for agitation and confusion.    All other systems reviewed and are negative.      Past Medical and Surgical History:     History reviewed. No pertinent past medical history.    History reviewed. No pertinent surgical history.    Meds/Allergies:    Prior to Admission medications    Medication Sig Start Date End Date Taking? Authorizing Provider   Ascorbic Acid (vitamin C) 1000 MG tablet Take 1,000 mg by mouth daily   Yes Historical Provider, MD   aspirin 81 mg chewable tablet Chew 81 mg daily   Yes Historical Provider, MD   clopidogrel (PLAVIX) 75 mg tablet Take 75 mg by mouth daily   Yes Historical Provider, MD   Evolocumab (Repatha SureClick) 140 MG/ML SOAJ Inject 140 mg under the skin every 14 (fourteen) days   Yes Historical Provider, MD   FLUoxetine (PROzac) 20 mg capsule Take 20 mg by mouth daily   Yes Historical Provider, MD   lansoprazole (PREVACID) 30 mg capsule Take 30 mg by mouth daily   Yes Historical Provider, MD   losartan (COZAAR) 25 mg tablet Take 25 mg by mouth daily   Yes Historical Provider, MD   metoprolol tartrate (LOPRESSOR) 25 mg tablet Take 25 mg by mouth every 12 (twelve) hours   Yes Historical Provider, MD     I have reviewed  "home medications with patient personally.    Allergies:   Allergies   Allergen Reactions    Penicillins Rash     Per mother, reaction was hives as a child       Social History:     Marital Status:    Patient Pre-hospital Living Situation: Lives by self  Patient Pre-hospital Level of Mobility: No restrictions  Patient Pre-hospital Diet Restrictions: Cardiac diet  Substance Use History:   Social History     Substance and Sexual Activity   Alcohol Use Yes    Alcohol/week: 5.0 standard drinks of alcohol    Types: 5 Shots of liquor per week     Social History     Tobacco Use   Smoking Status Every Day    Current packs/day: 0.50    Types: Cigarettes   Smokeless Tobacco Never     Social History     Substance and Sexual Activity   Drug Use Never       Family History:    non-contributory    Physical Exam:     Vitals:   Blood Pressure: 144/74 (07/27/24 1606)  Pulse: 97 (07/27/24 1606)  Temperature: 97.9 °F (36.6 °C) (07/27/24 1606)  Temp Source: Oral (07/27/24 1249)  Respirations: 19 (07/27/24 1606)  Height: 5' 9\" (175.3 cm) (07/27/24 1302)  Weight - Scale: 81.4 kg (179 lb 7.6 oz) (07/27/24 1249)  SpO2: 97 % (07/27/24 1606)    Physical Exam  Constitutional:       General: He is not in acute distress.  HENT:      Head: Normocephalic and atraumatic.   Eyes:      General: No scleral icterus.     Conjunctiva/sclera: Conjunctivae normal.   Cardiovascular:      Rate and Rhythm: Tachycardia present. Rhythm irregular.      Pulses: Normal pulses.      Heart sounds: No murmur heard.  Pulmonary:      Effort: Pulmonary effort is normal. No respiratory distress.      Breath sounds: No wheezing or rales.   Abdominal:      General: Bowel sounds are normal. There is distension.      Tenderness: There is no abdominal tenderness.   Musculoskeletal:         General: No swelling. Normal range of motion.   Skin:     General: Skin is warm.      Capillary Refill: Capillary refill takes less than 2 seconds.      Coloration: Skin is not " jaundiced.      Findings: No bruising.   Neurological:      General: No focal deficit present.      Mental Status: He is alert. Mental status is at baseline.   Psychiatric:         Mood and Affect: Mood normal.         Behavior: Behavior normal.         Additional Data:     Lab Results: I have personally reviewed pertinent reports.      Results from last 7 days   Lab Units 07/27/24  1253   WBC Thousand/uL 18.82*   HEMOGLOBIN g/dL 14.6   HEMATOCRIT % 42.7   PLATELETS Thousands/uL 208   SEGS PCT % 57   LYMPHO PCT % 33   MONO PCT % 7   EOS PCT % 1     Results from last 7 days   Lab Units 07/27/24  1255   SODIUM mmol/L 141   POTASSIUM mmol/L 2.9*   CHLORIDE mmol/L 102   CO2 mmol/L 14*   BUN mg/dL 18   CREATININE mg/dL 0.76   ANION GAP mmol/L 25*   CALCIUM mg/dL 9.2   ALBUMIN g/dL 4.1   TOTAL BILIRUBIN mg/dL 1.05*   ALK PHOS U/L 75   ALT U/L 35   AST U/L 57*   GLUCOSE RANDOM mg/dL 67     Results from last 7 days   Lab Units 07/27/24  1255   INR  0.99             Results from last 7 days   Lab Units 07/27/24  1513   LACTIC ACID mmol/L 10.1*       Imaging: I have personally reviewed pertinent reports.      XR chest 1 view portable    (Results Pending)       EKG, Pathology, and Other Studies Reviewed on Admission:   EKG: A-fib with RVR, no signs of ischemia    Allscripts / Epic Records Reviewed: Yes     ** Please Note: This note has been constructed using a voice recognition system. **    Paco Delcid DO

## 2024-07-27 NOTE — ASSESSMENT & PLAN NOTE
Elevated lactic acidosis in the setting of hypotension due to A-fib with RVR, dehydration due to alcohol abuse  2hr repeat lactic resolved 0.7  Anion gap closed  S/p IVF  Resolved

## 2024-07-27 NOTE — ED NOTES
Dr. Haynes does not want a sepsis alert called at this time.     Cristian Leyva RN  07/27/24 1274

## 2024-07-28 PROBLEM — R79.89 ELEVATED TROPONIN: Status: ACTIVE | Noted: 2024-07-28

## 2024-07-28 PROBLEM — E87.21 ACUTE METABOLIC ACIDOSIS: Status: RESOLVED | Noted: 2024-07-27 | Resolved: 2024-07-28

## 2024-07-28 LAB
ALBUMIN SERPL BCG-MCNC: 3.3 G/DL (ref 3.5–5)
ALP SERPL-CCNC: 58 U/L (ref 34–104)
ALT SERPL W P-5'-P-CCNC: 27 U/L (ref 7–52)
ANION GAP SERPL CALCULATED.3IONS-SCNC: 7 MMOL/L (ref 4–13)
APTT PPP: 63 SECONDS (ref 23–37)
APTT PPP: 66 SECONDS (ref 23–37)
AST SERPL W P-5'-P-CCNC: 51 U/L (ref 13–39)
BASOPHILS # BLD AUTO: 0.04 THOUSANDS/ÂΜL (ref 0–0.1)
BASOPHILS NFR BLD AUTO: 0 % (ref 0–1)
BILIRUB SERPL-MCNC: 1.17 MG/DL (ref 0.2–1)
BUN SERPL-MCNC: 18 MG/DL (ref 5–25)
CALCIUM ALBUM COR SERPL-MCNC: 8.6 MG/DL (ref 8.3–10.1)
CALCIUM SERPL-MCNC: 8 MG/DL (ref 8.4–10.2)
CHLORIDE SERPL-SCNC: 109 MMOL/L (ref 96–108)
CO2 SERPL-SCNC: 20 MMOL/L (ref 21–32)
CREAT SERPL-MCNC: 0.61 MG/DL (ref 0.6–1.3)
EOSINOPHIL # BLD AUTO: 0.05 THOUSAND/ÂΜL (ref 0–0.61)
EOSINOPHIL NFR BLD AUTO: 1 % (ref 0–6)
ERYTHROCYTE [DISTWIDTH] IN BLOOD BY AUTOMATED COUNT: 12.5 % (ref 11.6–15.1)
FOLATE SERPL-MCNC: 14.5 NG/ML
GFR SERPL CREATININE-BSD FRML MDRD: 102 ML/MIN/1.73SQ M
GLUCOSE SERPL-MCNC: 91 MG/DL (ref 65–140)
HCT VFR BLD AUTO: 36.4 % (ref 36.5–49.3)
HGB BLD-MCNC: 12 G/DL (ref 12–17)
IMM GRANULOCYTES # BLD AUTO: 0.03 THOUSAND/UL (ref 0–0.2)
IMM GRANULOCYTES NFR BLD AUTO: 0 % (ref 0–2)
LYMPHOCYTES # BLD AUTO: 2.4 THOUSANDS/ÂΜL (ref 0.6–4.47)
LYMPHOCYTES NFR BLD AUTO: 23 % (ref 14–44)
MAGNESIUM SERPL-MCNC: 2.1 MG/DL (ref 1.9–2.7)
MCH RBC QN AUTO: 34.7 PG (ref 26.8–34.3)
MCHC RBC AUTO-ENTMCNC: 33 G/DL (ref 31.4–37.4)
MCV RBC AUTO: 105 FL (ref 82–98)
MONOCYTES # BLD AUTO: 0.9 THOUSAND/ÂΜL (ref 0.17–1.22)
MONOCYTES NFR BLD AUTO: 9 % (ref 4–12)
NEUTROPHILS # BLD AUTO: 6.93 THOUSANDS/ÂΜL (ref 1.85–7.62)
NEUTS SEG NFR BLD AUTO: 67 % (ref 43–75)
NRBC BLD AUTO-RTO: 0 /100 WBCS
PLATELET # BLD AUTO: 139 THOUSANDS/UL (ref 149–390)
PMV BLD AUTO: 9.4 FL (ref 8.9–12.7)
POTASSIUM SERPL-SCNC: 4.8 MMOL/L (ref 3.5–5.3)
PROT SERPL-MCNC: 5.7 G/DL (ref 6.4–8.4)
RBC # BLD AUTO: 3.46 MILLION/UL (ref 3.88–5.62)
SODIUM SERPL-SCNC: 136 MMOL/L (ref 135–147)
T4 FREE SERPL-MCNC: 1.01 NG/DL (ref 0.61–1.12)
VIT B12 SERPL-MCNC: 294 PG/ML (ref 180–914)
WBC # BLD AUTO: 10.35 THOUSAND/UL (ref 4.31–10.16)

## 2024-07-28 PROCEDURE — 99223 1ST HOSP IP/OBS HIGH 75: CPT | Performed by: INTERNAL MEDICINE

## 2024-07-28 PROCEDURE — 83735 ASSAY OF MAGNESIUM: CPT | Performed by: STUDENT IN AN ORGANIZED HEALTH CARE EDUCATION/TRAINING PROGRAM

## 2024-07-28 PROCEDURE — 85730 THROMBOPLASTIN TIME PARTIAL: CPT | Performed by: INTERNAL MEDICINE

## 2024-07-28 PROCEDURE — 85025 COMPLETE CBC W/AUTO DIFF WBC: CPT | Performed by: STUDENT IN AN ORGANIZED HEALTH CARE EDUCATION/TRAINING PROGRAM

## 2024-07-28 PROCEDURE — 84439 ASSAY OF FREE THYROXINE: CPT | Performed by: STUDENT IN AN ORGANIZED HEALTH CARE EDUCATION/TRAINING PROGRAM

## 2024-07-28 PROCEDURE — 99232 SBSQ HOSP IP/OBS MODERATE 35: CPT | Performed by: INTERNAL MEDICINE

## 2024-07-28 PROCEDURE — 80053 COMPREHEN METABOLIC PANEL: CPT | Performed by: STUDENT IN AN ORGANIZED HEALTH CARE EDUCATION/TRAINING PROGRAM

## 2024-07-28 RX ORDER — ENOXAPARIN SODIUM 100 MG/ML
1 INJECTION SUBCUTANEOUS EVERY 12 HOURS SCHEDULED
Status: DISCONTINUED | OUTPATIENT
Start: 2024-07-28 | End: 2024-07-29

## 2024-07-28 RX ORDER — CLOPIDOGREL BISULFATE 75 MG/1
75 TABLET ORAL DAILY
Status: DISCONTINUED | OUTPATIENT
Start: 2024-07-28 | End: 2024-07-29 | Stop reason: HOSPADM

## 2024-07-28 RX ORDER — LANOLIN ALCOHOL/MO/W.PET/CERES
6 CREAM (GRAM) TOPICAL
Status: DISCONTINUED | OUTPATIENT
Start: 2024-07-28 | End: 2024-07-29 | Stop reason: HOSPADM

## 2024-07-28 RX ADMIN — ACETAMINOPHEN 650 MG: 325 TABLET ORAL at 19:37

## 2024-07-28 RX ADMIN — Medication 12.5 MG: at 08:46

## 2024-07-28 RX ADMIN — Medication 12.5 MG: at 21:22

## 2024-07-28 RX ADMIN — SODIUM CHLORIDE 125 ML/HR: 0.9 INJECTION, SOLUTION INTRAVENOUS at 06:21

## 2024-07-28 RX ADMIN — THIAMINE HCL TAB 100 MG 100 MG: 100 TAB at 08:46

## 2024-07-28 RX ADMIN — Medication 800 MCG: at 08:46

## 2024-07-28 RX ADMIN — Medication 6 MG: at 21:22

## 2024-07-28 RX ADMIN — ENOXAPARIN SODIUM 80 MG: 80 INJECTION SUBCUTANEOUS at 16:49

## 2024-07-28 RX ADMIN — Medication 1 TABLET: at 13:47

## 2024-07-28 RX ADMIN — CLOPIDOGREL 75 MG: 75 TABLET, FILM COATED ORAL at 16:48

## 2024-07-28 RX ADMIN — ACETAMINOPHEN 650 MG: 325 TABLET ORAL at 06:16

## 2024-07-28 RX ADMIN — NICOTINE 1 PATCH: 21 PATCH, EXTENDED RELEASE TRANSDERMAL at 08:47

## 2024-07-28 RX ADMIN — PANTOPRAZOLE SODIUM 40 MG: 40 INJECTION, POWDER, FOR SOLUTION INTRAVENOUS at 06:17

## 2024-07-28 RX ADMIN — CYANOCOBALAMIN TAB 500 MCG 500 MCG: 500 TAB at 08:46

## 2024-07-28 RX ADMIN — FLUOXETINE HYDROCHLORIDE 20 MG: 20 CAPSULE ORAL at 08:46

## 2024-07-28 RX ADMIN — ASPIRIN 81 MG CHEWABLE TABLET 81 MG: 81 TABLET CHEWABLE at 08:46

## 2024-07-28 NOTE — ASSESSMENT & PLAN NOTE
ECG: Atrial fibrillation with RVR  Troponin level: 0hr 28, 2hr 270, 4hr 342  Continue ASA, statin, Plavix  Continue telemetry monitoring  Cardiology following, Pending stress test results 7/29

## 2024-07-28 NOTE — PROGRESS NOTES
[facility]  Progress Note  Name: Chalo Ahuja I  MRN: [id number]  Unit/Bed#: [address] [address]01 I Date of Admission: 7/27/2024   Date of Service: 7/28/2024 I Hospital Day: 1    Assessment & Plan   * New onset atrial fibrillation (HCC)  Assessment & Plan  Patient presents with new onset A-fib with RVR likely due to alcohol abuse  Vitals improved with Cardizem x 1, IV fluids, stable on admission  Mildly elevated D-dimer, age adjusted to WNL. Hold off on imaging, low suspicion given principal problem  Continue Lopressor 12.5 mg twice daily  AC with therapeutic Lovenox. Price check Eliquis $47/month.  Obtain echo  Monitor on telemetry  Converted to NSR 7/28/24  Consult to Cardiology, recommendations are appreciated    Elevated troponin  Assessment & Plan  ECG: Atrial fibrillation with RVR  Troponin level: 0hr 28, 2hr 270, 4hr 342  Continue ASA, statin, Plavix  AC with therapeutic Lovenox  Telemetry monitoring  Cardiology following, plan for NM stress test Monday 7/29/24    Leukocytosis  Assessment & Plan  Leukocytosis likely reactive due to dehydration and afib   Respiratory panel negative  Lactic acid normalized  BC x2 pending  No signs/symptoms of infection, monitor off ABX  WBC down trending to 10K      Vitamin D deficiency  Assessment & Plan  OTC vitamin D supplementation 2000 IU daily    Mixed hyperlipidemia  Assessment & Plan  Patient appears to be on Repatha with cardiology outpatient    Gastroesophageal reflux disease without esophagitis  Assessment & Plan  Continue PPI therapy    Depression  Assessment & Plan  Continue Prozac daily, recent loss of mother    CAD in native artery  Assessment & Plan  S/p stenting 2020 with outside facility, POA no shortness of breath, no chest pain, no palpitations  Continue home beta-blocker therapy, ARB, currently on home Aspirin/Plavix  Appreciate cardiology evaluation    Alcohol abuse  Assessment & Plan  Significant history of alcohol abuse, 15+ drinks  on Friday  Placed on CIWA protocol. Required IV ativan overnight 7/27-7/28 for withdrawal symptoms  Folic acid, thiamine, multivitamin supplementation  Case management consulted for ETOH resources    Acute metabolic acidosis-resolved as of 7/28/2024  Assessment & Plan  Elevated lactic acidosis in the setting of hypotension due to A-fib with RVR, dehydration due to alcohol abuse  2hr repeat lactic resolved 0.7  Anion gap closed  S/p IVF  Resolved             VTE Pharmacologic Prophylaxis:   Moderate Risk (Score 3-4) - Pharmacological DVT Prophylaxis Ordered: heparin drip.    Mobility:   Basic Mobility Inpatient Raw Score: 24  JH-HLM Goal: 8: Walk 250 feet or more  JH-HLM Achieved: 7: Walk 25 feet or more  JH-HLM Goal achieved. Continue to encourage appropriate mobility.    Patient Centered Rounds: I performed bedside rounds with nursing staff today.   Discussions with Specialists or Other Care Team Provider: Nursing, Cardiology    Education and Discussions with Family / Patient: Attempted to update  (daughter) via phone. Left voicemail.     Total Time Spent on Date of Encounter in care of patient: 45 mins. This time was spent on one or more of the following: performing physical exam; counseling and coordination of care; obtaining or reviewing history; documenting in the medical record; reviewing/ordering tests, medications or procedures; communicating with other healthcare professionals and discussing with patient's family/caregivers.    Current Length of Stay: 1 day(s)  Current Patient Status: Inpatient   Certification Statement: The patient will continue to require additional inpatient hospital stay due to afib with RVR, alcohol withdrawal   Discharge Plan: Anticipate discharge in 24-48 hrs to home.    Code Status: Level 1 - Full Code    Subjective:   Patient denies any cardiac complaints this morning. Reports alcohol withdrawal symptoms still present but improved after ativan last night. Appetite is  good. No other complaints at this time.     Objective:     Vitals:   Temp (24hrs), Av.8 °F (36.6 °C), Min:97.3 °F (36.3 °C), Max:98.1 °F (36.7 °C)    Temp:  [97.3 °F (36.3 °C)-98.1 °F (36.7 °C)] 98 °F (36.7 °C)  HR:  [] 83  Resp:  [17-19] 17  BP: (121-158)/(56-92) 158/92  SpO2:  [91 %-100 %] 96 %  Body mass index is 26.5 kg/m².     Input and Output Summary (last 24 hours):     Intake/Output Summary (Last 24 hours) at 2024 1439  Last data filed at 2024 1352  Gross per 24 hour   Intake 1180 ml   Output 400 ml   Net 780 ml       Physical Exam:   Physical Exam  Vitals and nursing note reviewed.   Constitutional:       General: He is not in acute distress.     Appearance: He is well-developed.   HENT:      Head: Normocephalic and atraumatic.   Eyes:      Conjunctiva/sclera: Conjunctivae normal.   Cardiovascular:      Rate and Rhythm: Normal rate. Rhythm irregular.      Heart sounds: No murmur heard.  Pulmonary:      Effort: Pulmonary effort is normal. No respiratory distress.      Breath sounds: Normal breath sounds.   Abdominal:      Palpations: Abdomen is soft.      Tenderness: There is no abdominal tenderness.   Musculoskeletal:         General: No swelling.      Cervical back: Neck supple.   Skin:     General: Skin is warm and dry.      Capillary Refill: Capillary refill takes less than 2 seconds.   Neurological:      Mental Status: He is alert.   Psychiatric:         Mood and Affect: Mood normal.          Additional Data:     Labs:  Results from last 7 days   Lab Units 24  0550   WBC Thousand/uL 10.35*   HEMOGLOBIN g/dL 12.0   HEMATOCRIT % 36.4*   PLATELETS Thousands/uL 139*   SEGS PCT % 67   LYMPHO PCT % 23   MONO PCT % 9   EOS PCT % 1     Results from last 7 days   Lab Units 24  0550   SODIUM mmol/L 136   POTASSIUM mmol/L 4.8   CHLORIDE mmol/L 109*   CO2 mmol/L 20*   BUN mg/dL 18   CREATININE mg/dL 0.61   ANION GAP mmol/L 7   CALCIUM mg/dL 8.0*   ALBUMIN g/dL 3.3*   TOTAL BILIRUBIN  mg/dL 1.17*   ALK PHOS U/L 58   ALT U/L 27   AST U/L 51*   GLUCOSE RANDOM mg/dL 91     Results from last 7 days   Lab Units 07/27/24  1614   INR  0.98             Results from last 7 days   Lab Units 07/27/24  2255 07/27/24  1614 07/27/24  1513   LACTIC ACID mmol/L 0.7 4.8* 10.1*       Lines/Drains:  Invasive Devices       Peripheral Intravenous Line  Duration             Peripheral IV 07/27/24 Left Antecubital 1 day    Peripheral IV 07/27/24 Left;Ventral (anterior) Forearm <1 day                      Telemetry:  Telemetry Orders (From admission, onward)               24 Hour Telemetry Monitoring  Continuous x 24 Hours (Telem)        Question:  Reason for 24 Hour Telemetry  Answer:  Decompensated CHF- and any one of the following: continuous diuretic infusion or total diuretic dose >200 mg daily, associated electrolyte derangement (I.e. K < 3.0), ionotropic drip (continuous infusion), hx of ventricular arrhythmia, or new EF < 35%                     Telemetry Reviewed: Atrial fibrillation. HR averaging 80s  Indication for Continued Telemetry Use: Arrthymias requiring medical therapy             Imaging: No pertinent imaging reviewed.    Recent Cultures (last 7 days):   Results from last 7 days   Lab Units 07/27/24  1513   BLOOD CULTURE  Received in Microbiology Lab. Culture in Progress.  Received in Microbiology Lab. Culture in Progress.       Last 24 Hours Medication List:   Current Facility-Administered Medications   Medication Dose Route Frequency Provider Last Rate    acetaminophen  650 mg Oral Q8H PRN Paco Delcid DO      aspirin  81 mg Oral Daily Paco Delcid DO      calcium carbonate  500 mg Oral Daily PRN Rosalio nAn MD      clopidogrel  75 mg Oral Daily Iman Coon PA-C      cyanocobalamin  500 mcg Oral Daily Paco Delcid DO      enoxaparin  1 mg/kg Subcutaneous Q12H [facility] Iman Coon PA-C      FLUoxetine  20 mg Oral Daily Paco Delcid DO      folic  acid  800 mcg Oral Daily Paco Delcid, DO      metoprolol  5 mg Intravenous Q6H PRN Paco Delcid DO      metoprolol tartrate  12.5 mg Oral Q12H [facility] Paco Delcid DO      multivitamin-minerals  1 tablet Oral Daily Iman Coon PA-C      nicotine  1 patch Transdermal Daily Paco Delcid, DO      pantoprazole  40 mg Intravenous Q24H [facility] Rosalio Ann MD      thiamine  100 mg Oral Daily Paco Delcid DO          Today, Patient Was Seen By: Iman Coon PA-C    **Please Note: This note may have been constructed using a voice recognition system.**

## 2024-07-28 NOTE — ASSESSMENT & PLAN NOTE
S/p stenting 2020 with outside facility, POA no shortness of breath, no chest pain, no palpitations  Continue home beta-blocker therapy, ARB, currently on home Aspirin/Plavix  Initial EKG demonstrated rapid A-fib  Patient self converted to sinus rhythm  Cardiology consulted, appreciate input  Continue lopressor 12.5 mg BID  Stress test 7/20 completed, results pending

## 2024-07-28 NOTE — PLAN OF CARE
Problem: Potential for Falls  Goal: Patient will remain free of falls  Description: INTERVENTIONS:  - Educate patient/family on patient safety including physical limitations  - Instruct patient to call for assistance with activity   - Consult OT/PT to assist with strengthening/mobility   - Keep Call bell within reach  - Keep bed low and locked with side rails adjusted as appropriate  - Keep care items and personal belongings within reach  - Initiate and maintain comfort rounds  - Make Fall Risk Sign visible to staff  - Initiate/Maintain bed alarm  - Apply yellow socks and bracelet for high fall risk patients  - Consider moving patient to room near nurses station  Outcome: Progressing     Problem: PAIN - ADULT  Goal: Verbalizes/displays adequate comfort level or baseline comfort level  Description: Interventions:  - Encourage patient to monitor pain and request assistance  - Assess pain using appropriate pain scale  - Administer analgesics based on type and severity of pain and evaluate response  - Implement non-pharmacological measures as appropriate and evaluate response  - Consider cultural and social influences on pain and pain management  - Notify physician/advanced practitioner if interventions unsuccessful or patient reports new pain  Outcome: Progressing     Problem: INFECTION - ADULT  Goal: Absence or prevention of progression during hospitalization  Description: INTERVENTIONS:  - Assess and monitor for signs and symptoms of infection  - Monitor lab/diagnostic results  - Monitor all insertion sites, i.e. indwelling lines, tubes, and drains  - [place] appropriate cooling/warming therapies per order  - Administer medications as ordered  - Instruct and encourage patient and family to use good hand hygiene technique  - Identify and instruct in appropriate isolation precautions for identified infection/condition  Outcome: Progressing     Problem: SAFETY ADULT  Goal: Patient will remain free of  falls  Description: INTERVENTIONS:  - Educate patient/family on patient safety including physical limitations  - Instruct patient to call for assistance with activity   - Consult OT/PT to assist with strengthening/mobility   - Keep Call bell within reach  - Keep bed low and locked with side rails adjusted as appropriate  - Keep care items and personal belongings within reach  - Initiate and maintain comfort rounds  - Make Fall Risk Sign visible to staff  - Initiate/Maintain bed alarm  - Apply yellow socks and bracelet for high fall risk patients  - Consider moving patient to room near nurses station  Outcome: Progressing  Goal: Maintain or return to baseline ADL function  Description: INTERVENTIONS:  -  Assess patient's ability to carry out ADLs; assess patient's baseline for ADL function and identify physical deficits which impact ability to perform ADLs (bathing, care of mouth/teeth, toileting, grooming, dressing, etc.)  - Assess/evaluate cause of self-care deficits   - Assess range of motion  - Assess patient's mobility; develop plan if impaired  - Assess patient's need for assistive devices and provide as appropriate  - Encourage maximum independence but intervene and supervise when necessary  - Involve family in performance of ADLs  - Assess for home care needs following discharge   - Consider OT consult to assist with ADL evaluation and planning for discharge  - Provide patient education as appropriate  Outcome: Progressing     Problem: DISCHARGE PLANNING  Goal: Discharge to home or other facility with appropriate resources  Description: INTERVENTIONS:  - Identify barriers to discharge w/patient and caregiver  - Arrange for needed discharge resources and transportation as appropriate  - Identify discharge learning needs (meds, wound care, etc.)  - Arrange for interpretive services to assist at discharge as needed  - Refer to Case Management Department for coordinating discharge planning if the patient needs  post-hospital services based on physician/advanced practitioner order or complex needs related to functional status, cognitive ability, or social support system  Outcome: Progressing     Problem: Knowledge Deficit  Goal: Patient/family/caregiver demonstrates understanding of disease process, treatment plan, medications, and discharge instructions  Description: Complete learning assessment and assess knowledge base.  Interventions:  - Provide teaching at level of understanding  - Provide teaching via preferred learning methods  Outcome: Progressing     Problem: NEUROSENSORY - ADULT  Goal: Achieves stable or improved neurological status  Description: INTERVENTIONS  - Monitor and report changes in neurological status  - Monitor vital signs such as temperature, blood pressure, glucose, and any other labs ordered   - Initiate measures to prevent increased intracranial pressure  - Monitor for seizure activity and implement precautions if appropriate      Outcome: Progressing  Goal: Remains free of injury related to seizures activity  Description: INTERVENTIONS  - Maintain airway, patient safety  and administer oxygen as ordered  - Monitor patient for seizure activity, document and report duration and description of seizure to physician/advanced practitioner  - If seizure occurs,  ensure patient safety during seizure  - Reorient patient post seizure  - Seizure pads on all 4 side rails  - Instruct patient/family to notify RN of any seizure activity including if an aura is experienced  - Instruct patient/family to call for assistance with activity based on nursing assessment  - Administer anti-seizure medications if ordered    Outcome: Progressing     Problem: CARDIOVASCULAR - ADULT  Goal: Maintains optimal cardiac output and hemodynamic stability  Description: INTERVENTIONS:  - Monitor I/O, vital signs and rhythm  - Monitor for S/S and trends of decreased cardiac output  - Administer and titrate ordered vasoactive  medications to optimize hemodynamic stability  - Assess quality of pulses, skin color and temperature  - Assess for signs of decreased coronary artery perfusion  - Instruct patient to report change in severity of symptoms  Outcome: Progressing  Goal: Absence of cardiac dysrhythmias or at baseline rhythm  Description: INTERVENTIONS:  - Continuous cardiac monitoring, vital signs, obtain 12 lead EKG if ordered  - Administer antiarrhythmic and heart rate control medications as ordered  - Monitor electrolytes and administer replacement therapy as ordered  Outcome: Progressing     Problem: METABOLIC, FLUID AND ELECTROLYTES - ADULT  Goal: Electrolytes maintained within normal limits  Description: INTERVENTIONS:  - Monitor labs and assess patient for signs and symptoms of electrolyte imbalances  - Administer electrolyte replacement as ordered  - Monitor response to electrolyte replacements, including repeat lab results as appropriate  - Instruct patient on fluid and nutrition as appropriate  Outcome: Progressing  Goal: Fluid balance maintained  Description: INTERVENTIONS:  - Monitor labs   - Monitor I/O and WT  - Instruct patient on fluid and nutrition as appropriate  - Assess for signs & symptoms of volume excess or deficit  Outcome: Progressing

## 2024-07-28 NOTE — CONSULTS
[facility]  [address] [place] [address] [id number]   [place] [address]  Cardiology Consultation    Chalo Ahuja  [id number]  1955      Consult for: Chest pain/no onset atrial fibrillation  Appreciate consult by: No primary care provider on file.    1. Atrial fibrillation (HCC)  Inpatient consult to Cardiology    Inpatient consult to Cardiology    apixaban (Eliquis) 5 mg      2. Alcohol withdrawal (HCC)  Inpatient consult to Case Management    Inpatient consult to Case Management      3. ACS (acute coronary syndrome) (Formerly [facility])  Inpatient consult to Cardiology    Inpatient consult to Cardiology         Discussion/Summary:   Severe acute chest pain with mild troponin elevation-history of prior stenting in 2020.  Reports significant binge drinking.  Also has history of gastritis.  Current symptoms may be secondary to alcohol induced gastritis.  However cannot completely rule out obstructive CAD.  Plan for nuclear stress test in a.m. Continue therapeutic anticoagulation    New onset atrial fibrillation-patient reports significant binge drinking.  He reports bereavement.  He has spontaneously converted back to regular rhythm.  Discussed with him the association with alcohol usage.  We will obtain an echocardiogram to help risk score.  He currently has 1 risk factor age greater than 65.  He reports having frequent falls at home due to alcohol usage.  Direct oral anticoagulation may be high risk in the long-term    Alcohol abuse-he would like to try and quit.  Case management will help    Smoking history    HPI:   68-year-old gentleman with history of coronary artery disease status post previous stenting, smoking history, alcohol abuse presents secondary to severe chest discomfort 5 out of 10 at rest.  Also reports having severe palpitations and diaphoresis.  He was noted to be in atrial fibrillation in the ER.  He states his symptoms improved in the ER.  He currently denies having active  chest pain.  He reports compliance with medications.  He recently lost his mother.  He reports having depression.  He reports compliance with outpatient cardiology visits.  He has not had any recent stress testing or imaging.  He is retired and mows his lawn without symptoms    History reviewed. No pertinent past medical history.  Social History     Socioeconomic History    Marital status:      Spouse name: Not on file    Number of children: Not on file    Years of education: Not on file    Highest education level: Not on file   Occupational History    Not on file   Tobacco Use    Smoking status: Every Day     Current packs/day: 0.50     Types: Cigarettes    Smokeless tobacco: Never   Vaping Use    Vaping status: Never Used   Substance and Sexual Activity    Alcohol use: Yes     Alcohol/week: 5.0 standard drinks of alcohol     Types: 5 Shots of liquor per week    Drug use: Never    Sexual activity: Not on file   Other Topics Concern    Not on file   Social History Narrative    Not on file     Social Determinants of Health     Financial Resource Strain: Not on file   Food Insecurity: Not on file   Transportation Needs: Not on file   Physical Activity: Not on file   Stress: Not on file   Social Connections: Not on file   Intimate Partner Violence: Not on file   Housing Stability: Not on file      History reviewed. No pertinent family history.  History reviewed. No pertinent surgical history.    Current Facility-Administered Medications:     acetaminophen (TYLENOL) tablet 650 mg, 650 mg, Oral, Q8H PRN, Paco Delcid DO, 650 mg at 07/28/24 0616    aspirin chewable tablet 81 mg, 81 mg, Oral, Daily, Paco Delcid DO, 81 mg at 07/28/24 0846    calcium carbonate (TUMS) chewable tablet 500 mg, 500 mg, Oral, Daily PRN, Rosalio Ann MD, 500 mg at 07/27/24 1649    cyanocobalamin (VITAMIN B-12) tablet 500 mcg, 500 mcg, Oral, Daily, Paco Delcid DO, 500 mcg at 07/28/24 0846     FLUoxetine (PROzac) capsule 20 mg, 20 mg, Oral, Daily, Paco Delcid DO, 20 mg at 07/28/24 0846    folic acid (FOLVITE) tablet 800 mcg, 800 mcg, Oral, Daily, Paco Delcid DO, 800 mcg at 07/28/24 0846    heparin (porcine) 25,000 units in 0.45% NaCl 250 mL infusion (premix), 3-20 Units/kg/hr (Order-Specific), Intravenous, Titrated, Paco Delcid DO, Last Rate: 11.2 mL/hr at 07/27/24 2349, 14 Units/kg/hr at 07/27/24 2349    heparin (porcine) injection 2,000 Units, 2,000 Units, Intravenous, Q6H PRN, Paco Delcid DO, 2,000 Units at 07/27/24 2347    heparin (porcine) injection 4,000 Units, 4,000 Units, Intravenous, Q6H PRN, Paco Delcid DO    metoprolol (LOPRESSOR) injection 5 mg, 5 mg, Intravenous, Q6H PRN, Paco Delcid DO    metoprolol tartrate (LOPRESSOR) partial tablet 12.5 mg, 12.5 mg, Oral, Q12H COURTNEY, Paco Delcid DO, 12.5 mg at 07/28/24 0846    multivitamin-minerals (CENTRUM) tablet 1 tablet, 1 tablet, Oral, Daily, Iman Coon PA-C, 1 tablet at 07/28/24 1347    nicotine (NICODERM CQ) 21 mg/24 hr TD 24 hr patch 1 patch, 1 patch, Transdermal, Daily, Paco Delcid DO, 1 patch at 07/28/24 0847    pantoprazole (PROTONIX) injection 40 mg, 40 mg, Intravenous, Q24H COURTNEY, Rosalio Ann MD, 40 mg at 07/28/24 0617    thiamine tablet 100 mg, 100 mg, Oral, Daily, Paco Delcid DO, 100 mg at 07/28/24 0846  Allergies   Allergen Reactions    Penicillins Rash     Per mother, reaction was hives as a child     Vitals:    07/27/24 2227 07/28/24 0224 07/28/24 0619 07/28/24 0744   BP: 136/78 151/90 151/90 158/92   BP Location:    Right arm   Pulse: 77 69 78 83   Resp: 18   17   Temp: 98.1 °F (36.7 °C)   98 °F (36.7 °C)   TempSrc:    Oral   SpO2: 91% 93% 92% 96%   Weight:       Height:           Review of Systems:   Review of Systems   Constitutional: Negative.    HENT: Negative.     Eyes: Negative.    Respiratory: Negative.      Cardiovascular:  Positive for chest pain and palpitations.   Gastrointestinal: Negative.    Endocrine: Negative.    Genitourinary: Negative.    Musculoskeletal: Negative.    Skin: Negative.    Allergic/Immunologic: Negative.    Neurological: Negative.    Hematological: Negative.    Psychiatric/Behavioral: Negative.         Vitals:    07/27/24 2227 07/28/24 0224 07/28/24 0619 07/28/24 0744   BP: 136/78 151/90 151/90 158/92   BP Location:    Right arm   Pulse: 77 69 78 83   Resp: 18   17   Temp: 98.1 °F (36.7 °C)   98 °F (36.7 °C)   TempSrc:    Oral   SpO2: 91% 93% 92% 96%   Weight:       Height:         Physical Examination:   Physical Exam  Constitutional:       General: He is not in acute distress.     Appearance: He is well-developed. He is not diaphoretic.   HENT:      Head: Normocephalic and atraumatic.      Right Ear: External ear normal.      Left Ear: External ear normal.   Eyes:      General: No scleral icterus.        Right eye: No discharge.         Left eye: No discharge.      Conjunctiva/sclera: Conjunctivae normal.      Pupils: Pupils are equal, round, and reactive to light.   Neck:      Thyroid: No thyromegaly.      Vascular: No JVD.      Trachea: No tracheal deviation.   Cardiovascular:      Rate and Rhythm: Normal rate and regular rhythm.      Heart sounds: No murmur heard.     No friction rub. No gallop.   Pulmonary:      Effort: Pulmonary effort is normal. No respiratory distress.      Breath sounds: Normal breath sounds. No stridor. No wheezing or rales.   Chest:      Chest wall: No tenderness.   Abdominal:      General: Bowel sounds are normal. There is no distension.      Palpations: Abdomen is soft. There is no mass.      Tenderness: There is no abdominal tenderness. There is no guarding or rebound.   Musculoskeletal:         General: No tenderness, deformity or edema. Normal range of motion.      Cervical back: Normal range of motion and neck supple.   Skin:     General: Skin is warm and  "dry.      Coloration: Skin is not pale.      Findings: No erythema or rash.   Neurological:      Mental Status: He is alert and oriented to person, place, and time.      Cranial Nerves: No cranial nerve deficit.      Motor: No abnormal muscle tone.      Coordination: Coordination normal.      Deep Tendon Reflexes: Reflexes are normal and symmetric. Reflexes normal.   Psychiatric:         Mood and Affect: Mood and affect normal.         Behavior: Behavior normal.         Thought Content: Thought content normal.         Judgment: Judgment normal.         Labs:     Lab Results   Component Value Date    WBC 10.35 (H) 07/28/2024    HGB 12.0 07/28/2024    HCT 36.4 (L) 07/28/2024     (H) 07/28/2024    RDW 12.5 07/28/2024     (L) 07/28/2024     BMP:  Lab Results   Component Value Date    SODIUM 136 07/28/2024    K 4.8 07/28/2024     (H) 07/28/2024    CO2 20 (L) 07/28/2024    BUN 18 07/28/2024    CREATININE 0.61 07/28/2024    GLUC 91 07/28/2024    CALCIUM 8.0 (L) 07/28/2024    CORRECTEDCA 8.6 07/28/2024    EGFR 102 07/28/2024    MG 2.1 07/28/2024     LFT:  Lab Results   Component Value Date    AST 51 (H) 07/28/2024    ALT 27 07/28/2024    ALKPHOS 58 07/28/2024    TP 5.7 (L) 07/28/2024    ALB 3.3 (L) 07/28/2024      Lab Results   Component Value Date    NDR7MGFDBRWC 0.379 (L) 07/27/2024     No results found for: \"HGBA1C\"  Lipid Profile:   No results found for: \"CHOLESTEROL\", \"HDL\", \"LDLCALC\", \"TRIG\"  No results found for: \"CHOLESTEROL\"  No results found for: \"CKTOTAL\", \"CKMB\", \"CKMBINDEX\", \"TROPONINI\"  No results found for: \"NTBNP\"   Recent Results (from the past 672 hour(s))   ECG 12 lead    Collection Time: 07/27/24 12:50 PM   Result Value Ref Range    Ventricular Rate 146 BPM    Atrial Rate 163 BPM    TN Interval  ms    QRSD Interval 104 ms    QT Interval 308 ms    QTC Interval 479 ms    P Axis  degrees    QRS Axis -2 degrees    T Wave Axis 38 degrees   CBC and differential    Collection Time: 07/27/24 " 12:53 PM   Result Value Ref Range    WBC 18.82 (H) 4.31 - 10.16 Thousand/uL    RBC 4.19 3.88 - 5.62 Million/uL    Hemoglobin 14.6 12.0 - 17.0 g/dL    Hematocrit 42.7 36.5 - 49.3 %     (H) 82 - 98 fL    MCH 34.8 (H) 26.8 - 34.3 pg    MCHC 34.2 31.4 - 37.4 g/dL    RDW 12.4 11.6 - 15.1 %    MPV 9.3 8.9 - 12.7 fL    Platelets 208 149 - 390 Thousands/uL    nRBC 0 /100 WBCs    Segmented % 57 43 - 75 %    Immature Grans % 1 0 - 2 %    Lymphocytes % 33 14 - 44 %    Monocytes % 7 4 - 12 %    Eosinophils Relative 1 0 - 6 %    Basophils Relative 1 0 - 1 %    Absolute Neutrophils 11.02 (H) 1.85 - 7.62 Thousands/µL    Absolute Immature Grans 0.10 0.00 - 0.20 Thousand/uL    Absolute Lymphocytes 6.12 (H) 0.60 - 4.47 Thousands/µL    Absolute Monocytes 1.32 (H) 0.17 - 1.22 Thousand/µL    Eosinophils Absolute 0.17 0.00 - 0.61 Thousand/µL    Basophils Absolute 0.09 0.00 - 0.10 Thousands/µL   B-Type Natriuretic Peptide(BNP)    Collection Time: 07/27/24 12:53 PM   Result Value Ref Range    BNP 44 0 - 100 pg/mL   Ethanol    Collection Time: 07/27/24 12:53 PM   Result Value Ref Range    Ethanol Lvl 60 (H) <10 mg/dL   Vitamin B12    Collection Time: 07/27/24 12:53 PM   Result Value Ref Range    Vitamin B-12 294 180 - 914 pg/mL   Folate    Collection Time: 07/27/24 12:53 PM   Result Value Ref Range    Folate 14.5 >5.9 ng/mL   Protime-INR    Collection Time: 07/27/24 12:55 PM   Result Value Ref Range    Protime 13.3 11.6 - 14.5 seconds    INR 0.99 0.84 - 1.19   APTT    Collection Time: 07/27/24 12:55 PM   Result Value Ref Range    PTT 24 23 - 37 seconds   Comprehensive metabolic panel    Collection Time: 07/27/24 12:55 PM   Result Value Ref Range    Sodium 141 135 - 147 mmol/L    Potassium 2.9 (L) 3.5 - 5.3 mmol/L    Chloride 102 96 - 108 mmol/L    CO2 14 (L) 21 - 32 mmol/L    ANION GAP 25 (H) 4 - 13 mmol/L    BUN 18 5 - 25 mg/dL    Creatinine 0.76 0.60 - 1.30 mg/dL    Glucose 67 65 - 140 mg/dL    Calcium 9.2 8.4 - 10.2 mg/dL    AST  "57 (H) 13 - 39 U/L    ALT 35 7 - 52 U/L    Alkaline Phosphatase 75 34 - 104 U/L    Total Protein 7.0 6.4 - 8.4 g/dL    Albumin 4.1 3.5 - 5.0 g/dL    Total Bilirubin 1.05 (H) 0.20 - 1.00 mg/dL    eGFR 93 ml/min/1.73sq m   Magnesium    Collection Time: 07/27/24 12:55 PM   Result Value Ref Range    Magnesium 1.6 (L) 1.9 - 2.7 mg/dL   Lipase    Collection Time: 07/27/24 12:55 PM   Result Value Ref Range    Lipase 33 11 - 82 u/L   HS Troponin 0hr (reflex protocol)    Collection Time: 07/27/24 12:55 PM   Result Value Ref Range    hs TnI 0hr 28 \"Refer to ACS Flowchart\"- see link ng/L   TSH    Collection Time: 07/27/24 12:55 PM   Result Value Ref Range    TSH 3RD GENERATON 0.379 (L) 0.450 - 4.500 uIU/mL   D-dimer, quantitative    Collection Time: 07/27/24 12:55 PM   Result Value Ref Range    D-Dimer, Quant 0.57 (H) <0.50 ug/ml FEU   FLU/RSV/COVID - if FLU/RSV clinically relevant    Collection Time: 07/27/24  2:50 PM    Specimen: Nose; Nares   Result Value Ref Range    SARS-CoV-2 Negative Negative    INFLUENZA A PCR Negative Negative    INFLUENZA B PCR Negative Negative    RSV PCR Negative Negative   HS Troponin I 2hr    Collection Time: 07/27/24  3:13 PM   Result Value Ref Range    hs TnI 2hr 270 (H) \"Refer to ACS Flowchart\"- see link ng/L    Delta 2hr hsTnI 242 (H) <20 ng/L   Lactic acid, plasma (w/reflex if result > 2.0)    Collection Time: 07/27/24  3:13 PM   Result Value Ref Range    LACTIC ACID 10.1 (HH) 0.5 - 2.0 mmol/L   Blood culture #1    Collection Time: 07/27/24  3:13 PM    Specimen: Hand, Left; Blood   Result Value Ref Range    Blood Culture Received in Microbiology Lab. Culture in Progress.    Blood culture #2    Collection Time: 07/27/24  3:13 PM    Specimen: Hand, Right; Blood   Result Value Ref Range    Blood Culture Received in Microbiology Lab. Culture in Progress.    HS Troponin I 4hr    Collection Time: 07/27/24  4:14 PM   Result Value Ref Range    hs TnI 4hr 342 (H) \"Refer to ACS Flowchart\"- see link " ng/L    Delta 4hr hsTnI 314 (H) <20 ng/L   APTT    Collection Time: 07/27/24  4:14 PM   Result Value Ref Range    PTT 24 23 - 37 seconds   Protime-INR    Collection Time: 07/27/24  4:14 PM   Result Value Ref Range    Protime 13.2 11.6 - 14.5 seconds    INR 0.98 0.84 - 1.19   Basic metabolic panel    Collection Time: 07/27/24  4:14 PM   Result Value Ref Range    Sodium 142 135 - 147 mmol/L    Potassium 4.0 3.5 - 5.3 mmol/L    Chloride 105 96 - 108 mmol/L    CO2 17 (L) 21 - 32 mmol/L    ANION GAP 20 (H) 4 - 13 mmol/L    BUN 20 5 - 25 mg/dL    Creatinine 0.85 0.60 - 1.30 mg/dL    Glucose 75 65 - 140 mg/dL    Calcium 8.7 8.4 - 10.2 mg/dL    eGFR 89 ml/min/1.73sq m   Magnesium    Collection Time: 07/27/24  4:14 PM   Result Value Ref Range    Magnesium 2.3 1.9 - 2.7 mg/dL   Lactic acid 2 Hours    Collection Time: 07/27/24  4:14 PM   Result Value Ref Range    LACTIC ACID 4.8 (HH) 0.5 - 2.0 mmol/L   APTT    Collection Time: 07/27/24 10:55 PM   Result Value Ref Range    PTT 45 (H) 23 - 37 seconds   Lactic acid, plasma (w/reflex if result > 2.0)    Collection Time: 07/27/24 10:55 PM   Result Value Ref Range    LACTIC ACID 0.7 0.5 - 2.0 mmol/L   APTT    Collection Time: 07/28/24  5:50 AM   Result Value Ref Range    PTT 66 (H) 23 - 37 seconds   Magnesium    Collection Time: 07/28/24  5:50 AM   Result Value Ref Range    Magnesium 2.1 1.9 - 2.7 mg/dL   Comprehensive metabolic panel    Collection Time: 07/28/24  5:50 AM   Result Value Ref Range    Sodium 136 135 - 147 mmol/L    Potassium 4.8 3.5 - 5.3 mmol/L    Chloride 109 (H) 96 - 108 mmol/L    CO2 20 (L) 21 - 32 mmol/L    ANION GAP 7 4 - 13 mmol/L    BUN 18 5 - 25 mg/dL    Creatinine 0.61 0.60 - 1.30 mg/dL    Glucose 91 65 - 140 mg/dL    Calcium 8.0 (L) 8.4 - 10.2 mg/dL    Corrected Calcium 8.6 8.3 - 10.1 mg/dL    AST 51 (H) 13 - 39 U/L    ALT 27 7 - 52 U/L    Alkaline Phosphatase 58 34 - 104 U/L    Total Protein 5.7 (L) 6.4 - 8.4 g/dL    Albumin 3.3 (L) 3.5 - 5.0 g/dL    Total  "Bilirubin 1.17 (H) 0.20 - 1.00 mg/dL    eGFR 102 ml/min/1.73sq m   CBC and differential    Collection Time: 07/28/24  5:50 AM   Result Value Ref Range    WBC 10.35 (H) 4.31 - 10.16 Thousand/uL    RBC 3.46 (L) 3.88 - 5.62 Million/uL    Hemoglobin 12.0 12.0 - 17.0 g/dL    Hematocrit 36.4 (L) 36.5 - 49.3 %     (H) 82 - 98 fL    MCH 34.7 (H) 26.8 - 34.3 pg    MCHC 33.0 31.4 - 37.4 g/dL    RDW 12.5 11.6 - 15.1 %    MPV 9.4 8.9 - 12.7 fL    Platelets 139 (L) 149 - 390 Thousands/uL    nRBC 0 /100 WBCs    Segmented % 67 43 - 75 %    Immature Grans % 0 0 - 2 %    Lymphocytes % 23 14 - 44 %    Monocytes % 9 4 - 12 %    Eosinophils Relative 1 0 - 6 %    Basophils Relative 0 0 - 1 %    Absolute Neutrophils 6.93 1.85 - 7.62 Thousands/µL    Absolute Immature Grans 0.03 0.00 - 0.20 Thousand/uL    Absolute Lymphocytes 2.40 0.60 - 4.47 Thousands/µL    Absolute Monocytes 0.90 0.17 - 1.22 Thousand/µL    Eosinophils Absolute 0.05 0.00 - 0.61 Thousand/µL    Basophils Absolute 0.04 0.00 - 0.10 Thousands/µL   APTT    Collection Time: 07/28/24  1:51 PM   Result Value Ref Range    PTT 63 (H) 23 - 37 seconds       Imaging & Testing   I have personally reviewed pertinent reports.      Cardiac Testing     EKG: Personally reviewed.          Dayday Torres MD [facility]  [phone]  Please call with any questions or suggestions    Counseling :  A description of the counseling:   Goals and Barriers:  Patient's ability to self care:  Medication side effect reviewed with patient in detail and all their questions answered.    \"Portions of the record may have been created with voice recognition software. Occasional wrong word or \"sound a like\" substitutions may have occurred due to the inherent limitations of voice recognition software. Read the chart carefully and recognize, using context, where substitutions have occurred. Please call if you have any questions. \"    "

## 2024-07-28 NOTE — ASSESSMENT & PLAN NOTE
Patient presents with new onset A-fib with RVR likely due to alcohol abuse  Vitals improved with Cardizem x 1, IV fluids, stable on admission  Mildly elevated D-dimer, age adjusted to WNL. Hold off on imaging, low suspicion given principal problem  Continue Lopressor 12.5 mg twice daily  AC with therapeutic Lovenox. Price check Eliquis $47/month.  Echo completed 7/29 LVEF 55%, no pericardial effusion   Stress test completed 7/29 results pending  Monitor on telemetry  Converted to NSR 7/28/24, remains NSR  Pt with hematuria, Lovenox discontinued  Cardiology consulted, input appreciated  Pt on dual antiplatelet therapy with Aspirin and Plavix

## 2024-07-28 NOTE — PLAN OF CARE
Problem: Potential for Falls  Goal: Patient will remain free of falls  Description: INTERVENTIONS:  - Educate patient/family on patient safety including physical limitations  - Instruct patient to call for assistance with activity   - Consult OT/PT to assist with strengthening/mobility   - Keep Call bell within reach  - Keep bed low and locked with side rails adjusted as appropriate  - Keep care items and personal belongings within reach  - Initiate and maintain comfort rounds  - Make Fall Risk Sign visible to staff  - Initiate/Maintain bed alarm  - Apply yellow socks and bracelet for high fall risk patients  - Consider moving patient to room near nurses station  Outcome: Progressing     Problem: PAIN - ADULT  Goal: Verbalizes/displays adequate comfort level or baseline comfort level  Description: Interventions:  - Encourage patient to monitor pain and request assistance  - Assess pain using appropriate pain scale  - Administer analgesics based on type and severity of pain and evaluate response  - Implement non-pharmacological measures as appropriate and evaluate response  - Consider cultural and social influences on pain and pain management  - Notify physician/advanced practitioner if interventions unsuccessful or patient reports new pain  Outcome: Progressing     Problem: INFECTION - ADULT  Goal: Absence or prevention of progression during hospitalization  Description: INTERVENTIONS:  - Assess and monitor for signs and symptoms of infection  - Monitor lab/diagnostic results  - Monitor all insertion sites, i.e. indwelling lines, tubes, and drains  - [place] appropriate cooling/warming therapies per order  - Administer medications as ordered  - Instruct and encourage patient and family to use good hand hygiene technique  - Identify and instruct in appropriate isolation precautions for identified infection/condition  Outcome: Progressing     Problem: SAFETY ADULT  Goal: Patient will remain free of  falls  Description: INTERVENTIONS:  - Educate patient/family on patient safety including physical limitations  - Instruct patient to call for assistance with activity   - Consult OT/PT to assist with strengthening/mobility   - Keep Call bell within reach  - Keep bed low and locked with side rails adjusted as appropriate  - Keep care items and personal belongings within reach  - Initiate and maintain comfort rounds  - Make Fall Risk Sign visible to staff  - Initiate/Maintain bed alarm  - Apply yellow socks and bracelet for high fall risk patients  - Consider moving patient to room near nurses station  Outcome: Progressing  Goal: Maintain or return to baseline ADL function  Description: INTERVENTIONS:  -  Assess patient's ability to carry out ADLs; assess patient's baseline for ADL function and identify physical deficits which impact ability to perform ADLs (bathing, care of mouth/teeth, toileting, grooming, dressing, etc.)  - Assess/evaluate cause of self-care deficits   - Assess range of motion  - Assess patient's mobility; develop plan if impaired  - Assess patient's need for assistive devices and provide as appropriate  - Encourage maximum independence but intervene and supervise when necessary  - Involve family in performance of ADLs  - Assess for home care needs following discharge   - Consider OT consult to assist with ADL evaluation and planning for discharge  - Provide patient education as appropriate  Outcome: Progressing     Problem: DISCHARGE PLANNING  Goal: Discharge to home or other facility with appropriate resources  Description: INTERVENTIONS:  - Identify barriers to discharge w/patient and caregiver  - Arrange for needed discharge resources and transportation as appropriate  - Identify discharge learning needs (meds, wound care, etc.)  - Arrange for interpretive services to assist at discharge as needed  - Refer to Case Management Department for coordinating discharge planning if the patient needs  post-hospital services based on physician/advanced practitioner order or complex needs related to functional status, cognitive ability, or social support system  Outcome: Progressing     Problem: Knowledge Deficit  Goal: Patient/family/caregiver demonstrates understanding of disease process, treatment plan, medications, and discharge instructions  Description: Complete learning assessment and assess knowledge base.  Interventions:  - Provide teaching at level of understanding  - Provide teaching via preferred learning methods  Outcome: Progressing     Problem: NEUROSENSORY - ADULT  Goal: Achieves stable or improved neurological status  Description: INTERVENTIONS  - Monitor and report changes in neurological status  - Monitor vital signs such as temperature, blood pressure, glucose, and any other labs ordered   - Initiate measures to prevent increased intracranial pressure  - Monitor for seizure activity and implement precautions if appropriate      Outcome: Progressing  Goal: Remains free of injury related to seizures activity  Description: INTERVENTIONS  - Maintain airway, patient safety  and administer oxygen as ordered  - Monitor patient for seizure activity, document and report duration and description of seizure to physician/advanced practitioner  - If seizure occurs,  ensure patient safety during seizure  - Reorient patient post seizure  - Seizure pads on all 4 side rails  - Instruct patient/family to notify RN of any seizure activity including if an aura is experienced  - Instruct patient/family to call for assistance with activity based on nursing assessment  - Administer anti-seizure medications if ordered    Outcome: Progressing     Problem: CARDIOVASCULAR - ADULT  Goal: Maintains optimal cardiac output and hemodynamic stability  Description: INTERVENTIONS:  - Monitor I/O, vital signs and rhythm  - Monitor for S/S and trends of decreased cardiac output  - Administer and titrate ordered vasoactive  medications to optimize hemodynamic stability  - Assess quality of pulses, skin color and temperature  - Assess for signs of decreased coronary artery perfusion  - Instruct patient to report change in severity of symptoms  Outcome: Progressing  Goal: Absence of cardiac dysrhythmias or at baseline rhythm  Description: INTERVENTIONS:  - Continuous cardiac monitoring, vital signs, obtain 12 lead EKG if ordered  - Administer antiarrhythmic and heart rate control medications as ordered  - Monitor electrolytes and administer replacement therapy as ordered  Outcome: Progressing     Problem: METABOLIC, FLUID AND ELECTROLYTES - ADULT  Goal: Electrolytes maintained within normal limits  Description: INTERVENTIONS:  - Monitor labs and assess patient for signs and symptoms of electrolyte imbalances  - Administer electrolyte replacement as ordered  - Monitor response to electrolyte replacements, including repeat lab results as appropriate  - Instruct patient on fluid and nutrition as appropriate  Outcome: Progressing  Goal: Fluid balance maintained  Description: INTERVENTIONS:  - Monitor labs   - Monitor I/O and WT  - Instruct patient on fluid and nutrition as appropriate  - Assess for signs & symptoms of volume excess or deficit  Outcome: Progressing

## 2024-07-28 NOTE — ASSESSMENT & PLAN NOTE
Significant history of alcohol abuse, 15+ drinks per day  Remains on CIWA protocol. Required IV ativan overnight 7/27-7/28 for withdrawal symptoms  Folic acid, thiamine, multivitamin supplementation  Case management consulted for ETOH resources  Pt agrees to inpatient alcohol rehab services upon discharge

## 2024-07-29 ENCOUNTER — APPOINTMENT (INPATIENT)
Dept: RADIOLOGY | Facility: HOSPITAL | Age: 69
DRG: 309 | End: 2024-07-29
Payer: MEDICARE

## 2024-07-29 ENCOUNTER — APPOINTMENT (INPATIENT)
Dept: NON INVASIVE DIAGNOSTICS | Facility: HOSPITAL | Age: 69
DRG: 309 | End: 2024-07-29
Payer: MEDICARE

## 2024-07-29 VITALS
WEIGHT: 179 LBS | DIASTOLIC BLOOD PRESSURE: 92 MMHG | OXYGEN SATURATION: 98 % | RESPIRATION RATE: 18 BRPM | TEMPERATURE: 98 F | HEIGHT: 69 IN | BODY MASS INDEX: 26.51 KG/M2 | SYSTOLIC BLOOD PRESSURE: 178 MMHG | HEART RATE: 70 BPM

## 2024-07-29 PROBLEM — Z72.0 TOBACCO ABUSE: Status: ACTIVE | Noted: 2024-07-29

## 2024-07-29 PROBLEM — D72.829 LEUKOCYTOSIS: Status: RESOLVED | Noted: 2024-07-27 | Resolved: 2024-07-29

## 2024-07-29 LAB
ANION GAP SERPL CALCULATED.3IONS-SCNC: 7 MMOL/L (ref 4–13)
AORTIC ROOT: 3.5 CM
APICAL FOUR CHAMBER EJECTION FRACTION: 59 %
ASCENDING AORTA: 3.1 CM
ATRIAL RATE: 163 BPM
BSA FOR ECHO PROCEDURE: 1.97 M2
BUN SERPL-MCNC: 7 MG/DL (ref 5–25)
CALCIUM SERPL-MCNC: 8.4 MG/DL (ref 8.4–10.2)
CHLORIDE SERPL-SCNC: 107 MMOL/L (ref 96–108)
CO2 SERPL-SCNC: 23 MMOL/L (ref 21–32)
CREAT SERPL-MCNC: 0.58 MG/DL (ref 0.6–1.3)
E WAVE DECELERATION TIME: 136 MS
E/A RATIO: 1.76
ERYTHROCYTE [DISTWIDTH] IN BLOOD BY AUTOMATED COUNT: 12.3 % (ref 11.6–15.1)
FRACTIONAL SHORTENING: 29 (ref 28–44)
GFR SERPL CREATININE-BSD FRML MDRD: 104 ML/MIN/1.73SQ M
GLUCOSE SERPL-MCNC: 110 MG/DL (ref 65–140)
HCT VFR BLD AUTO: 33.7 % (ref 36.5–49.3)
HGB BLD-MCNC: 11.6 G/DL (ref 12–17)
INTERVENTRICULAR SEPTUM IN DIASTOLE (PARASTERNAL SHORT AXIS VIEW): 1.4 CM
INTERVENTRICULAR SEPTUM: 1.4 CM (ref 0.6–1.1)
LAAS-AP2: 28.2 CM2
LAAS-AP4: 20.9 CM2
LEFT ATRIUM AREA SYSTOLE SINGLE PLANE A4C: 20.7 CM2
LEFT ATRIUM SIZE: 4.7 CM
LEFT ATRIUM VOLUME (MOD BIPLANE): 83 ML
LEFT ATRIUM VOLUME INDEX (MOD BIPLANE): 42.1 ML/M2
LEFT INTERNAL DIMENSION IN SYSTOLE: 3.4 CM (ref 2.1–4)
LEFT VENTRICULAR INTERNAL DIMENSION IN DIASTOLE: 4.8 CM (ref 3.5–6)
LEFT VENTRICULAR POSTERIOR WALL IN END DIASTOLE: 1.3 CM
LEFT VENTRICULAR STROKE VOLUME: 60 ML
LVSV (TEICH): 60 ML
MAX DIASTOLIC BP: 100 MMHG
MAX HR PERCENT: 73 %
MAX HR: 112 BPM
MAX PREDICTED HEART RATE: 152 BPM
MCH RBC QN AUTO: 35 PG (ref 26.8–34.3)
MCHC RBC AUTO-ENTMCNC: 34.4 G/DL (ref 31.4–37.4)
MCV RBC AUTO: 102 FL (ref 82–98)
MITRAL REGURGITATION PEAK VELOCITY: 6.49 M/S
MITRAL VALVE MEAN INFLOW VELOCITY: 5.34 M/S
MITRAL VALVE REGURGITANT PEAK GRADIENT: 169 MMHG
MV E'TISSUE VEL-SEP: 11 CM/S
MV PEAK A VEL: 0.5 M/S
MV PEAK E VEL: 88 CM/S
MV STENOSIS PRESSURE HALF TIME: 39 MS
MV VALVE AREA P 1/2 METHOD: 5.64
NUC STRESS EJECTION FRACTION: 56 %
PLATELET # BLD AUTO: 120 THOUSANDS/UL (ref 149–390)
PMV BLD AUTO: 9.8 FL (ref 8.9–12.7)
POTASSIUM SERPL-SCNC: 3.6 MMOL/L (ref 3.5–5.3)
PROTOCOL NAME: NORMAL
QRS AXIS: -2 DEGREES
QRSD INTERVAL: 104 MS
QT INTERVAL: 308 MS
QTC INTERVAL: 479 MS
RATE PRESSURE PRODUCT: NORMAL
RBC # BLD AUTO: 3.31 MILLION/UL (ref 3.88–5.62)
RIGHT ATRIAL 2D VOLUME: 31 ML
RIGHT ATRIUM AREA SYSTOLE A4C: 13.3 CM2
RIGHT VENTRICLE ID DIMENSION: 3.3 CM
SL CV DOP CALC MV VTI RETROGRADE: 250.1 CM
SL CV LEFT ATRIUM LENGTH A2C: 6.7 CM
SL CV LV EF: 55
SL CV MV MEAN GRADIENT RETROGRADE: 123 MMHG
SL CV PED ECHO LEFT VENTRICLE DIASTOLIC VOLUME (MOD BIPLANE) 2D: 107 ML
SL CV PED ECHO LEFT VENTRICLE SYSTOLIC VOLUME (MOD BIPLANE) 2D: 47 ML
SL CV REST NUCLEAR ISOTOPE DOSE: 11 MCI
SL CV STRESS NUCLEAR ISOTOPE DOSE: 31 MCI
SL CV STRESS RECOVERY BP: NORMAL MMHG
SL CV STRESS RECOVERY HR: 77 BPM
SL CV STRESS RECOVERY O2 SAT: 96 %
SODIUM SERPL-SCNC: 137 MMOL/L (ref 135–147)
STRESS ANGINA INDEX: 0
STRESS BASELINE BP: NORMAL MMHG
STRESS BASELINE HR: 62 BPM
STRESS O2 SAT REST: 96 %
STRESS PEAK HR: 93 BPM
STRESS POST ESTIMATED WORKLOAD: 1 METS
STRESS POST EXERCISE DUR MIN: 1 MIN
STRESS POST EXERCISE DUR MIN: 1 MIN
STRESS POST EXERCISE DUR SEC: 0 SEC
STRESS POST O2 SAT PEAK: 96 %
STRESS POST PEAK BP: 179 MMHG
STRESS POST PEAK HR: 112 BPM
STRESS POST PEAK SYSTOLIC BP: 197 MMHG
STRESS/REST PERFUSION RATIO: 1.03
T WAVE AXIS: 38 DEGREES
TARGET HR FORMULA: NORMAL
TEST INDICATION: NORMAL
TR MAX PG: 48 MMHG
TR PEAK VELOCITY: 3.5 M/S
TRICUSPID ANNULAR PLANE SYSTOLIC EXCURSION: 2.6 CM
TRICUSPID VALVE PEAK REGURGITATION VELOCITY: 3.45 M/S
VENTRICULAR RATE: 146 BPM
WBC # BLD AUTO: 6.38 THOUSAND/UL (ref 4.31–10.16)

## 2024-07-29 PROCEDURE — 93306 TTE W/DOPPLER COMPLETE: CPT

## 2024-07-29 PROCEDURE — 85027 COMPLETE CBC AUTOMATED: CPT | Performed by: INTERNAL MEDICINE

## 2024-07-29 PROCEDURE — 80048 BASIC METABOLIC PNL TOTAL CA: CPT | Performed by: INTERNAL MEDICINE

## 2024-07-29 PROCEDURE — 93010 ELECTROCARDIOGRAM REPORT: CPT | Performed by: INTERNAL MEDICINE

## 2024-07-29 PROCEDURE — 78452 HT MUSCLE IMAGE SPECT MULT: CPT | Performed by: INTERNAL MEDICINE

## 2024-07-29 PROCEDURE — 99239 HOSP IP/OBS DSCHRG MGMT >30: CPT

## 2024-07-29 PROCEDURE — 78452 HT MUSCLE IMAGE SPECT MULT: CPT

## 2024-07-29 PROCEDURE — 99232 SBSQ HOSP IP/OBS MODERATE 35: CPT | Performed by: INTERNAL MEDICINE

## 2024-07-29 PROCEDURE — A9502 TC99M TETROFOSMIN: HCPCS

## 2024-07-29 PROCEDURE — 93016 CV STRESS TEST SUPVJ ONLY: CPT | Performed by: INTERNAL MEDICINE

## 2024-07-29 PROCEDURE — 93017 CV STRESS TEST TRACING ONLY: CPT

## 2024-07-29 PROCEDURE — 93018 CV STRESS TEST I&R ONLY: CPT | Performed by: INTERNAL MEDICINE

## 2024-07-29 PROCEDURE — 93306 TTE W/DOPPLER COMPLETE: CPT | Performed by: INTERNAL MEDICINE

## 2024-07-29 PROCEDURE — 93005 ELECTROCARDIOGRAM TRACING: CPT

## 2024-07-29 RX ORDER — FUROSEMIDE 10 MG/ML
20 INJECTION INTRAMUSCULAR; INTRAVENOUS ONCE
Status: COMPLETED | OUTPATIENT
Start: 2024-07-29 | End: 2024-07-29

## 2024-07-29 RX ORDER — POTASSIUM CHLORIDE 20 MEQ/1
40 TABLET, EXTENDED RELEASE ORAL 2 TIMES DAILY
Status: COMPLETED | OUTPATIENT
Start: 2024-07-29 | End: 2024-07-29

## 2024-07-29 RX ORDER — LOSARTAN POTASSIUM 25 MG/1
25 TABLET ORAL DAILY
Status: DISCONTINUED | OUTPATIENT
Start: 2024-07-29 | End: 2024-07-29 | Stop reason: HOSPADM

## 2024-07-29 RX ORDER — LORAZEPAM 1 MG/1
2 TABLET ORAL ONCE
Status: COMPLETED | OUTPATIENT
Start: 2024-07-29 | End: 2024-07-29

## 2024-07-29 RX ORDER — LANOLIN ALCOHOL/MO/W.PET/CERES
100 CREAM (GRAM) TOPICAL DAILY
Qty: 5 TABLET | Refills: 0 | Status: SHIPPED | OUTPATIENT
Start: 2024-07-30 | End: 2024-08-04

## 2024-07-29 RX ORDER — REGADENOSON 0.08 MG/ML
0.4 INJECTION, SOLUTION INTRAVENOUS ONCE
Status: COMPLETED | OUTPATIENT
Start: 2024-07-29 | End: 2024-07-29

## 2024-07-29 RX ORDER — FOLIC ACID 0.8 MG
800 TABLET ORAL DAILY
Qty: 30 TABLET | Refills: 0 | Status: SHIPPED | OUTPATIENT
Start: 2024-07-30 | End: 2024-08-29

## 2024-07-29 RX ORDER — NICOTINE 21 MG/24HR
1 PATCH, TRANSDERMAL 24 HOURS TRANSDERMAL DAILY
Qty: 28 PATCH | Refills: 0 | Status: SHIPPED | OUTPATIENT
Start: 2024-07-30

## 2024-07-29 RX ADMIN — LOSARTAN POTASSIUM 25 MG: 25 TABLET, FILM COATED ORAL at 12:20

## 2024-07-29 RX ADMIN — LORAZEPAM 2 MG: 1 TABLET ORAL at 01:04

## 2024-07-29 RX ADMIN — NICOTINE 1 PATCH: 21 PATCH, EXTENDED RELEASE TRANSDERMAL at 08:33

## 2024-07-29 RX ADMIN — REGADENOSON 0.4 MG: 0.08 INJECTION, SOLUTION INTRAVENOUS at 10:36

## 2024-07-29 RX ADMIN — CYANOCOBALAMIN TAB 500 MCG 500 MCG: 500 TAB at 08:33

## 2024-07-29 RX ADMIN — FLUOXETINE HYDROCHLORIDE 20 MG: 20 CAPSULE ORAL at 08:33

## 2024-07-29 RX ADMIN — Medication 800 MCG: at 08:33

## 2024-07-29 RX ADMIN — PANTOPRAZOLE SODIUM 40 MG: 40 INJECTION, POWDER, FOR SOLUTION INTRAVENOUS at 06:08

## 2024-07-29 RX ADMIN — ENOXAPARIN SODIUM 80 MG: 80 INJECTION SUBCUTANEOUS at 04:30

## 2024-07-29 RX ADMIN — POTASSIUM CHLORIDE 40 MEQ: 1500 TABLET, EXTENDED RELEASE ORAL at 12:21

## 2024-07-29 RX ADMIN — FUROSEMIDE 20 MG: 10 INJECTION, SOLUTION INTRAMUSCULAR; INTRAVENOUS at 12:21

## 2024-07-29 RX ADMIN — ACETAMINOPHEN 650 MG: 325 TABLET ORAL at 06:09

## 2024-07-29 RX ADMIN — ASPIRIN 81 MG CHEWABLE TABLET 81 MG: 81 TABLET CHEWABLE at 08:33

## 2024-07-29 RX ADMIN — Medication 1 TABLET: at 08:33

## 2024-07-29 RX ADMIN — CLOPIDOGREL 75 MG: 75 TABLET, FILM COATED ORAL at 08:33

## 2024-07-29 RX ADMIN — POTASSIUM CHLORIDE 40 MEQ: 1500 TABLET, EXTENDED RELEASE ORAL at 17:02

## 2024-07-29 RX ADMIN — THIAMINE HCL TAB 100 MG 100 MG: 100 TAB at 08:33

## 2024-07-29 NOTE — PROGRESS NOTES
Progress Note - Cardiology   [facility]     Chalo Ahuja 68 y.o. male MRN: [id number]  : 1955  Unit/Bed#: [address] [place] [address] Encounter: [id number]    Assessment and Plan:   1. Chest pain: patient with history of coronary artery disease and follows with Dr. Charles: 10/14/2020 patient had placement of coronary stent on the LAD and left circumflex at [facility]    -   hs trop: 28 (0hr), 270 (2hr), 342 (4hr)    -   initial 12 lead EKG demonstrated rapid atrial fibrillation    -   patient has converted back to sinus rhythm    -   continue Lopressor 12.5 mg BID    -   for pharmacological stress test today to evaluate for ischemia    2. Paroxysmal atrial fibrillation: patient presented with complaints of chest discomfort and was noted to be in rapid atrial fibrillation,    -   was given IV Cardizem in the emergency room which caused hypotension and was discontinued    -   patient self converted back to sinus rhythm    -   continue Lopressor 12.5 mg BID    -   [id number] score = 2, or 2.2% risk of stroke, patient currently on dual antiplatelet therapy with aspirin and Plavix    -   patient does have history of frequent falls at home. HASBLED = 4, or 8.9% risk of bleeding per year. Patient has significant risk factors for injury on anticoagulation.    3. Hypertension: Cozaar held on admission secondary to hypotension.    -   Blood pressures now elevated running 140s to 170 systolic    -   continue Lopressor 12.5 mg BID    -   will reintroduce patients Cozaar 25 mg daily and continue to monitor    4. EtOH abuse: encourage cessation      Subjective / Objective:   Patient admitted on 2024 with presentation to the emergency room with complaints of left sided chest pain. He does have significant history of EtOH abuse. 2D echocardiogram performed today demonstrates an EF of 55% with grade 2 diastolic dysfunction, left atrium was moderately dilated and IVC was also noted to be  "dilated    Vitals: Blood pressure 170/94, pulse 67, temperature 97.7 °F (36.5 °C), temperature source Oral, resp. rate 20, height 5' 9\" (1.753 m), weight 81.2 kg (179 lb), SpO2 93%.  Vitals:    07/27/24 1548 07/29/24 0700   Weight: 81.4 kg (179 lb 7.6 oz) 81.2 kg (179 lb)     Body mass index is 26.43 kg/m².  BP Readings from Last 3 Encounters:   07/29/24 170/94   05/14/24 163/72   05/04/24 (!) 195/88     Orthostatic Blood Pressures      Flowsheet Row Most Recent Value   Blood Pressure 170/94 filed at 07/29/2024 0833   Patient Position - Orthostatic VS Lying filed at 07/29/2024 0833          I/O         07/27 0701  07/28 0700 07/28 0701  07/29 0700 07/29 0701  07/30 0700    P.O.  280 480    I.V. (mL/kg) 0 (0) 1020 (12.6) 10 (0.1)    Total Intake(mL/kg) 0 (0) 1300 (16) 490 (6)    Urine (mL/kg/hr) 400 200 (0.1)     Total Output 400 200     Net -400 +1100 +490                 Invasive Devices       Peripheral Intravenous Line  Duration             Peripheral IV 07/27/24 Left Antecubital 1 day    Peripheral IV 07/27/24 Left;Ventral (anterior) Forearm 1 day                      Intake/Output Summary (Last 24 hours) at 7/29/2024 1137  Last data filed at 7/29/2024 0830  Gross per 24 hour   Intake 1790 ml   Output 200 ml   Net 1590 ml         Physical Exam:   Physical Exam  Vitals and nursing note reviewed.   Constitutional:       Appearance: Normal appearance. He is normal weight. He is ill-appearing (Chronically).   Eyes:      General: No scleral icterus.        Right eye: No discharge.         Left eye: No discharge.   Cardiovascular:      Rate and Rhythm: Normal rate and regular rhythm.      Pulses: Normal pulses.   Pulmonary:      Effort: Pulmonary effort is normal. No respiratory distress.      Breath sounds: Normal breath sounds.   Abdominal:      General: Bowel sounds are normal. There is no distension.      Palpations: Abdomen is soft.   Musculoskeletal:      Right lower leg: No edema.      Left lower leg: No edema. "   Skin:     General: Skin is warm and dry.      Capillary Refill: Capillary refill takes less than 2 seconds.   Neurological:      General: No focal deficit present.      Mental Status: He is alert and oriented to person, place, and time. Mental status is at baseline.   Psychiatric:         Mood and Affect: Mood normal.                Medications/ Allergies:     Current Facility-Administered Medications   Medication Dose Route Frequency Provider Last Rate    acetaminophen  650 mg Oral Q8H PRN Paco Delcid, DO      aspirin  81 mg Oral Daily Paco Delcid, DO      calcium carbonate  500 mg Oral Daily PRN Rosalio Ann MD      clopidogrel  75 mg Oral Daily Iman Coon PA-C      cyanocobalamin  500 mcg Oral Daily Paco Delcid, DO      enoxaparin  1 mg/kg Subcutaneous Q12H COURTNEY Iman Coon PA-C      FLUoxetine  20 mg Oral Daily Paco Delcid, DO      folic acid  800 mcg Oral Daily Paco Delcid, DO      losartan  25 mg Oral Daily ANANDA Haynes      melatonin  6 mg Oral HS ANANDA Marie      metoprolol  5 mg Intravenous Q6H PRN Paco Delcid, DO      metoprolol tartrate  12.5 mg Oral Q12H [facility] Paco Delcid, DO      multivitamin-minerals  1 tablet Oral Daily Iman Coon PA-C      nicotine  1 patch Transdermal Daily Paco Delcid, DO      pantoprazole  40 mg Intravenous Q24H [facility] Rosalio Ann MD      potassium chloride  40 mEq Oral BID ANANDA Haynes      thiamine  100 mg Oral Daily Pcao Delcid, DO       acetaminophen, 650 mg, Q8H PRN  calcium carbonate, 500 mg, Daily PRN  metoprolol, 5 mg, Q6H PRN      Allergies   Allergen Reactions    Penicillins Rash     Per mother, reaction was hives as a child       VTE Pharmacologic Prophylaxis:   Sequential compression device (Venodyne)     Labs:   Troponins:  Results from last 7 days   Lab Units 07/27/24  1614 07/27/24  1513   HSTNI D2 ng/L  --  242*   HSTNI  D4 ng/L 314*  --      CBC with diff:  Results from last 7 days   Lab Units 07/29/24  0437 07/28/24  0550 07/27/24  1253   WBC Thousand/uL 6.38 10.35* 18.82*   HEMOGLOBIN g/dL 11.6* 12.0 14.6   HEMATOCRIT % 33.7* 36.4* 42.7   MCV fL 102* 105* 102*   PLATELETS Thousands/uL 120* 139* 208   RBC Million/uL 3.31* 3.46* 4.19   MCH pg 35.0* 34.7* 34.8*   MCHC g/dL 34.4 33.0 34.2   RDW % 12.3 12.5 12.4   MPV fL 9.8 9.4 9.3   NRBC AUTO /100 WBCs  --  0 0     CMP:  Results from last 7 days   Lab Units 07/29/24  0437 07/28/24  0550 07/27/24  1614 07/27/24  1255   SODIUM mmol/L 137 136 142 141   POTASSIUM mmol/L 3.6 4.8 4.0 2.9*   CHLORIDE mmol/L 107 109* 105 102   CO2 mmol/L 23 20* 17* 14*   ANION GAP mmol/L 7 7 20* 25*   BUN mg/dL 7 18 20 18   CREATININE mg/dL 0.58* 0.61 0.85 0.76   CALCIUM mg/dL 8.4 8.0* 8.7 9.2   AST U/L  --  51*  --  57*   ALT U/L  --  27  --  35   ALK PHOS U/L  --  58  --  75   TOTAL PROTEIN g/dL  --  5.7*  --  7.0   ALBUMIN g/dL  --  3.3*  --  4.1   TOTAL BILIRUBIN mg/dL  --  1.17*  --  1.05*   EGFR ml/min/1.73sq m 104 102 89 93     Magnesium:  Results from last 7 days   Lab Units 07/28/24  0550 07/27/24  1614 07/27/24  1255   MAGNESIUM mg/dL 2.1 2.3 1.6*     Coags:  Results from last 7 days   Lab Units 07/28/24  1351 07/28/24  0550 07/27/24  2255 07/27/24  1614 07/27/24  1255   PTT seconds 63* 66* 45* 24 24   INR   --   --   --  0.98 0.99     TSH:  Results from last 7 days   Lab Units 07/27/24  1255   TSH 3RD GENERATON uIU/mL 0.379*        Imaging & Testing   I have personally reviewed pertinent reports.    Echo complete w/ contrast if indicated    Result Date: 7/29/2024  Narrative:   Left Ventricle: Left ventricular cavity size is normal. Wall thickness is mildly increased. There is mild concentric hypertrophy. The left ventricular ejection fraction is 55% by visual estimation. Systolic function is normal. Wall motion is normal. Diastolic function is moderately abnormal, consistent with grade II  "(pseudonormal) relaxation.   Right Ventricle: Right ventricular cavity size is upper normal.   Left Atrium: The atrium is moderately dilated.   Mitral Valve: There is mild annular calcification. There is mild to moderate regurgitation.   Tricuspid Valve: There is mild to moderate regurgitation.  Calculated pulmonary artery pressure around 50 to 55 mmHg.   IVC/SVC: The inferior vena cava is mildly dilated.  It has more than 50% collapse with inspiration   Echo was done in sinus rhythm.  No old echo available for comparison.     XR chest 1 view portable    Result Date: 7/27/2024  Narrative: XR CHEST PORTABLE INDICATION: chest pain. COMPARISON: None FINDINGS: Clear lungs. No pneumothorax or pleural effusion. Normal cardiomediastinal silhouette. Bones are unremarkable for age. Normal upper abdomen.     Impression: No acute cardiopulmonary disease. Workstation performed: [id number]        EKG / Monitor: Personally reviewed.    Sinus rhythm          Valerie MALAVE  Cardiology      \"This note was completed in part utilizing [employer] direct voice recognition software.   Grammatical errors, random word insertion, spelling mistakes, and incomplete sentences may be an occasional consequence of the system secondary to software limitations, ambient noise and hardware issues.    Please read the chart carefully and recognize, using context, where substitutions have occurred.  If you have any questions or concerns about the context, text or information contained within the body of this dictation, please contact myself, the provider, for further clarification.\"  "

## 2024-07-29 NOTE — ED NOTES
7/29/24 @ 6706:  Duyen from [facility] arrived, was provided facesheet and will meet with patient to discuss alcohol treatment options. Chepe MS    [id number]: Duyen reports that patient wasn't interested in inpatient but agreed to participate in an IOP through [facility] as well as peer support.    Chepe MS

## 2024-07-29 NOTE — ASSESSMENT & PLAN NOTE
ECG: Atrial fibrillation with RVR  Troponin level: 0hr 28, 2hr 270, 4hr 342  Continue ASA, statin, Plavix  Continue telemetry monitoring  Cardiology following appreciate input

## 2024-07-29 NOTE — PROGRESS NOTES
[facility]  Progress Note  Name: Chalo Ahuja I  MRN: [id number]  Unit/Bed#: [address] [address] I Date of Admission: 7/27/2024   Date of Service: 7/29/2024 I Hospital Day: 2    Assessment & Plan   * New onset atrial fibrillation (HCC)  Assessment & Plan  Patient presents with new onset A-fib with RVR likely due to alcohol abuse  Vitals improved with Cardizem x 1, IV fluids, stable on admission  Mildly elevated D-dimer, age adjusted to WNL. Hold off on imaging, low suspicion given principal problem  Continue Lopressor 12.5 mg twice daily  AC with therapeutic Lovenox. Price check Eliquis $47/month.  Echo completed 7/29 LVEF 55%, no pericardial effusion   Stress test completed 7/29 results pending  Monitor on telemetry  Converted to NSR 7/28/24, remains NSR  Pt with hematuria, Lovenox discontinued  Cardiology consulted, input appreciated  Pt on dual antiplatelet therapy with Aspirin and Plavix      CAD in native artery  Assessment & Plan  S/p stenting 2020 with outside facility, POA no shortness of breath, no chest pain, no palpitations  Continue home beta-blocker therapy, ARB, currently on home Aspirin/Plavix  Initial EKG demonstrated rapid A-fib  Patient self converted to sinus rhythm  Cardiology consulted, appreciate input  Continue lopressor 12.5 mg BID  Stress test 7/20 completed, results pending    Elevated troponin  Assessment & Plan  ECG: Atrial fibrillation with RVR  Troponin level: 0hr 28, 2hr 270, 4hr 342  Continue ASA, statin, Plavix  Continue telemetry monitoring  Cardiology following, Pending stress test results 7/29    Alcohol abuse  Assessment & Plan  Significant history of alcohol abuse, 15+ drinks per day  Remains on CIWA protocol. Required IV ativan overnight 7/27-7/28 for withdrawal symptoms  Folic acid, thiamine, multivitamin supplementation  Case management consulted for ETOH resources  Pt agrees to inpatient alcohol rehab services upon discharge    Leukocytosis  Assessment  & Plan  Leukocytosis likely reactive due to dehydration and afib   Respiratory panel negative  Lactic acid normalized  BC x2 pending  No signs/symptoms of infection, monitor off ABX  WBC down trending to 10K      Vitamin D deficiency  Assessment & Plan  OTC vitamin D supplementation 2000 IU daily    Mixed hyperlipidemia  Assessment & Plan  Patient appears to be on Repatha with cardiology outpatient    Gastroesophageal reflux disease without esophagitis  Assessment & Plan  Continue PPI therapy    Depression  Assessment & Plan  Continue Prozac daily, recent loss of mother    Acute metabolic acidosis-resolved as of 7/28/2024  Assessment & Plan  Elevated lactic acidosis in the setting of hypotension due to A-fib with RVR, dehydration due to alcohol abuse  2hr repeat lactic resolved 0.7  Anion gap closed  S/p IVF  Resolved                 VTE Pharmacologic Prophylaxis:   Moderate Risk (Score 3-4) - Pharmacological DVT Prophylaxis Ordered: apixaban (Eliquis).    Mobility:   Basic Mobility Inpatient Raw Score: 24  JH-HLM Goal: 8: Walk 250 feet or more  JH-HLM Achieved: 7: Walk 25 feet or more  JH-HLM Goal achieved. Continue to encourage appropriate mobility.    Patient Centered Rounds: I performed bedside rounds with nursing staff today.   Discussions with Specialists or Other Care Team Provider: Case Management.    Education and Discussions with Family / Patient: Updated  (daughter) at bedside.    Total Time Spent on Date of Encounter in care of patient: 35 mins. This time was spent on one or more of the following: performing physical exam; counseling and coordination of care; obtaining or reviewing history; documenting in the medical record; reviewing/ordering tests, medications or procedures; communicating with other healthcare professionals and discussing with patient's family/caregivers.    Current Length of Stay: 2 day(s)  Current Patient Status: Inpatient   Certification Statement: The patient will  continue to require additional inpatient hospital stay due to pending cardiology studies.  Discharge Plan: Anticipate discharge in 24-48 hrs to rehab facility.    Code Status: Level 1 - Full Code    Subjective:   Pt was seen and examined at bedside, A&O x's 3 sitting in bed. Pt reports just returning from completing stress and Echo and awaiting the results. Reports no chest pain, palpitation, light head, dizziness. Pt offers no further concerns at this time, expresses willingness to transition to inpatient rehab upon discharge for alcohol cessation.     Objective:     Vitals:   Temp (24hrs), Av.7 °F (36.5 °C), Min:97.2 °F (36.2 °C), Max:98.1 °F (36.7 °C)    Temp:  [97.2 °F (36.2 °C)-98.1 °F (36.7 °C)] 97.6 °F (36.4 °C)  HR:  [65-76] 73  Resp:  [18-20] 20  BP: (143-177)/(82-97) 177/93  SpO2:  [90 %-96 %] 95 %  Body mass index is 26.43 kg/m².     Input and Output Summary (last 24 hours):     Intake/Output Summary (Last 24 hours) at 2024 1356  Last data filed at 2024 1230  Gross per 24 hour   Intake 728 ml   Output 1050 ml   Net -322 ml       Physical Exam:   Physical Exam  HENT:      Head: Normocephalic.      Mouth/Throat:      Mouth: Mucous membranes are moist.   Eyes:      Pupils: Pupils are equal, round, and reactive to light.   Cardiovascular:      Rate and Rhythm: Normal rate and regular rhythm.      Pulses:           Radial pulses are 2+ on the right side and 2+ on the left side.        Dorsalis pedis pulses are 2+ on the right side and 2+ on the left side.      Heart sounds: Normal heart sounds.   Pulmonary:      Effort: Pulmonary effort is normal.      Breath sounds: Normal breath sounds.   Abdominal:      General: Bowel sounds are normal.      Palpations: Abdomen is soft.   Musculoskeletal:         General: Normal range of motion.      Cervical back: Normal range of motion.   Skin:     General: Skin is warm and dry.      Capillary Refill: Capillary refill takes less than 2 seconds.    Neurological:      General: No focal deficit present.      Mental Status: He is alert and oriented to person, place, and time.   Psychiatric:         Behavior: Behavior normal.          Additional Data:     Labs:  Results from last 7 days   Lab Units 07/29/24  0437 07/28/24  0550   WBC Thousand/uL 6.38 10.35*   HEMOGLOBIN g/dL 11.6* 12.0   HEMATOCRIT % 33.7* 36.4*   PLATELETS Thousands/uL 120* 139*   SEGS PCT %  --  67   LYMPHO PCT %  --  23   MONO PCT %  --  9   EOS PCT %  --  1     Results from last 7 days   Lab Units 07/29/24  0437 07/28/24  0550   SODIUM mmol/L 137 136   POTASSIUM mmol/L 3.6 4.8   CHLORIDE mmol/L 107 109*   CO2 mmol/L 23 20*   BUN mg/dL 7 18   CREATININE mg/dL 0.58* 0.61   ANION GAP mmol/L 7 7   CALCIUM mg/dL 8.4 8.0*   ALBUMIN g/dL  --  3.3*   TOTAL BILIRUBIN mg/dL  --  1.17*   ALK PHOS U/L  --  58   ALT U/L  --  27   AST U/L  --  51*   GLUCOSE RANDOM mg/dL 110 91     Results from last 7 days   Lab Units 07/27/24  1614   INR  0.98             Results from last 7 days   Lab Units 07/27/24  2255 07/27/24  1614 07/27/24  1513   LACTIC ACID mmol/L 0.7 4.8* 10.1*       Lines/Drains:  Invasive Devices       Peripheral Intravenous Line  Duration             Peripheral IV 07/27/24 Left Antecubital 1 day    Peripheral IV 07/27/24 Left;Ventral (anterior) Forearm 1 day                      Telemetry:  Telemetry Orders (From admission, onward)               24 Hour Telemetry Monitoring  Continuous x 24 Hours (Telem)        Question:  Reason for 24 Hour Telemetry  Answer:  Decompensated CHF- and any one of the following: continuous diuretic infusion or total diuretic dose >200 mg daily, associated electrolyte derangement (I.e. K < 3.0), ionotropic drip (continuous infusion), hx of ventricular arrhythmia, or new EF < 35%                     Telemetry Reviewed: Normal Sinus Rhythm  Indication for Continued Telemetry Use: Arrthymias requiring medical therapy             Imaging: Reviewed radiology reports  from this admission including: xray(s) and ECHO    Recent Cultures (last 7 days):   Results from last 7 days   Lab Units 07/27/24  1513   BLOOD CULTURE  No Growth at 24 hrs.  No Growth at 24 hrs.       Last 24 Hours Medication List:   Current Facility-Administered Medications   Medication Dose Route Frequency Provider Last Rate    acetaminophen  650 mg Oral Q8H PRN Paco Delcid, DO      aspirin  81 mg Oral Daily Paco Delcid, DO      calcium carbonate  500 mg Oral Daily PRN Rosalio Ann MD      clopidogrel  75 mg Oral Daily Iman Coon PA-C      cyanocobalamin  500 mcg Oral Daily Paco Delcid,       FLUoxetine  20 mg Oral Daily Paco Delcid,       folic acid  800 mcg Oral Daily Paco Delcid, DO      losartan  25 mg Oral Daily ANANDA Haynes      melatonin  6 mg Oral HS ANANDA Marie      metoprolol  5 mg Intravenous Q6H PRN Paco Delcid DO      metoprolol tartrate  12.5 mg Oral Q12H [facility] Paco Delcid DO      multivitamin-minerals  1 tablet Oral Daily Iman Coon PA-C      nicotine  1 patch Transdermal Daily Paco Delcid DO      pantoprazole  40 mg Intravenous Q24H [facility] Rosalio Ann MD      potassium chloride  40 mEq Oral BID ANANDA Haynes      thiamine  100 mg Oral Daily Paco Delcid DO          Today, Patient Was Seen By:  ANANDA Ureña     **Please Note: This note may have been constructed using a voice recognition system.**

## 2024-07-29 NOTE — CASE MANAGEMENT
Case Management Assessment & Discharge Planning Note    Patient name Chalo Ahuja  Location [address] [address]/4 [place] [address]-* MRN [id number]  : 1955 Date 2024       Current Admission Date: 2024  Current Admission Diagnosis:New onset atrial fibrillation (HCC)   Patient Active Problem List    Diagnosis Date Noted Date Diagnosed    Elevated troponin 2024     New onset atrial fibrillation (HCC) 2024     Leukocytosis 2024     Electrolyte abnormality 2024     Alcohol abuse 10/14/2020     CAD in native artery 10/14/2020     Depression 10/14/2020     Gastroesophageal reflux disease without esophagitis 10/14/2020     Mixed hyperlipidemia 10/14/2020     S/P drug eluting coronary stent placement 10/14/2020     Vitamin D deficiency 10/14/2020       LOS (days): 2  Geometric Mean LOS (GMLOS) (days): 2.3  Days to GMLOS:0.3     OBJECTIVE:    Risk of Unplanned Readmission Score: 14.4     Current admission status: Inpatient    Preferred Pharmacy:   [facility] [address] [place] [address] [address]  [address]  [facility] [address]  Phone: [phone] Fax: [phone]    Primary Care Provider: No primary care provider on file.    Primary Insurance: MEDICARE  Secondary Insurance: [employer]    ASSESSMENT:  Active Health Care Proxies    There are no active Health Care Proxies on file.         Readmission Root Cause  30 Day Readmission: No    Patient Information  Admitted from:: Home  Mental Status: Alert  During Assessment patient was accompanied by: Daughter (Patient's Daughter Stephany)  Support Systems: Self, Family members, Daughter  County of Residence: [place]  What city do you live in?: [place]  Living Arrangements: Lives Alone    Patient Information Continued  Income Source: Pension/custodial  Does patient have prescription coverage?: Yes      Means of Transportation  Means of Transport to Appts:: Drives Self      Social Determinants of Health (SDOH)      Flowsheet Row Most  Recent Value   Housing Stability    In the last 12 months, was there a time when you were not able to pay the mortgage or rent on time? N   In the past 12 months, how many times have you moved where you were living? 0   At any time in the past 12 months, were you homeless or living in a shelter (including now)? N   Transportation Needs    In the past 12 months, has lack of transportation kept you from medical appointments or from getting medications? no   In the past 12 months, has lack of transportation kept you from meetings, work, or from getting things needed for daily living? No   Food Insecurity    Within the past 12 months, you worried that your food would run out before you got the money to buy more. Never true   Within the past 12 months, the food you bought just didn't last and you didn't have money to get more. Never true   Utilities    In the past 12 months has the electric, gas, oil, or water company threatened to shut off services in your home? No            DISCHARGE DETAILS:    Discharge planning discussed with:: Patient and Patient's Arvind Hammond  Freedom of Choice: Yes     CM contacted family/caregiver?: Yes    Contacts  Patient Contacts: Arvind Hammond  Relationship to Patient:: Family  Contact Method: In Person  Reason/Outcome: Continuity of Care, Emergency Contact, Discharge Planning    Other Referral/Resources/Interventions Provided:  Interventions: Substance Abuse Treatment      Treatment Team Recommendation: Substance Abuse Treatment  Discharge Destination Plan:: Substance Abuse Treatment      CM met briefly with patient/patient's Arvind Hammond to introduce self/role and discuss discharge planning. Arvind Hammond stated patient's choice is to go to inpatient rehab for alcohol dependency. CM made Stephany aware that CM will place a referral with [facility] who can assist in finding an inpatient rehab facility for patient so there is a plan in place at discharge.     CM called [facility] and spoke  with Shayy who stated she will have the on-call [facility] Rep visit patient bedside today. CM was made aware that Michelle from [facility] visited patient bedside.    CM to give Stephany a phone call this afternoon to update her and also complete patient's assessment.

## 2024-07-29 NOTE — ASSESSMENT & PLAN NOTE
Significant history of alcohol abuse, 15+ drinks per day  Remains on CIWA protocol. Required IV ativan overnight 7/27-7/28 for withdrawal symptoms  Folic acid, thiamine, multivitamin supplementation  Case management consulted for ETOH resources  Pt refuses inpatient alcohol rehab services

## 2024-07-29 NOTE — ASSESSMENT & PLAN NOTE
Patient presents with new onset A-fib with RVR likely due to alcohol abuse  Vitals improved with Cardizem x 1, IV fluids, stable on admission  Mildly elevated D-dimer, age adjusted to WNL. Hold off on imaging, low suspicion given principal problem  Continue Lopressor 12.5 mg twice daily  AC with therapeutic Lovenox. Price check Eliquis $47/month.  Echo completed 7/29 LVEF 55%, no pericardial effusion   Stress test completed 7/29 no ischemia and normal LV function monitor on telemetry  Converted to NSR 7/28/24, remains NSR   Lovenox discontinued.  Patient with history of frequent falls not a good candidate for long-term antithrombotic therapy  Cardiology consulted, input appreciated  Pt on dual antiplatelet therapy with Aspirin and Plavix

## 2024-07-29 NOTE — CASE MANAGEMENT
Case Management Discharge Planning Note    Patient name Chalo Ahuja  Location [address] [place] [address]/[address] [address]-* MRN [id number]  : 1955 Date 2024       Current Admission Date: 2024  Current Admission Diagnosis:New onset atrial fibrillation (HCC)   Patient Active Problem List    Diagnosis Date Noted Date Diagnosed    Elevated troponin 2024     New onset atrial fibrillation (HCC) 2024     Leukocytosis 2024     Electrolyte abnormality 2024     Alcohol abuse 10/14/2020     CAD in native artery 10/14/2020     Depression 10/14/2020     Gastroesophageal reflux disease without esophagitis 10/14/2020     Mixed hyperlipidemia 10/14/2020     S/P drug eluting coronary stent placement 10/14/2020     Vitamin D deficiency 10/14/2020       LOS (days): 2  Geometric Mean LOS (GMLOS) (days): 2.3  Days to GMLOS:0.3     OBJECTIVE:  Risk of Unplanned Readmission Score: 14.4     Current admission status: Inpatient   Preferred Pharmacy:   [facility] [address] [place] [address] [address]  [address]  [facility] [address]  Phone: [phone] Fax: [phone]    Primary Care Provider: No primary care provider on file.    Primary Insurance: MEDICARE  Secondary Insurance: [facility]    DISCHARGE DETAILS:    Discharge planning discussed with:: Patient's Daughter Stephany  Freedom of Choice: Yes    CM contacted family/caregiver?: Yes  Were Treatment Team discharge recommendations reviewed with patient/caregiver?: Yes    Contacts  Patient Contacts: Arvind Hammond  Relationship to Patient:: Family  Contact Method: Phone  Phone Number: [phone]  Reason/Outcome: Continuity of Care, Discharge Planning      Treatment Team Recommendation: Substance Abuse Treatment  Discharge Destination Plan:: Home    CM called patient's Daughter Stephany to [facility]. CM made Stephany chavez that Michelle from [facility] met with patient and offered inpatient rehab placement however patient  refused. After patient refused inpatient placement Michelle offered [facility]. Patient was in agreement with IOP.     Stephany was disappointed by patient's unwillingness to go to inpatient rehab and feels that is the key/only way for patient to be successful in his recovery.

## 2024-07-29 NOTE — DISCHARGE SUMMARY
[facility]  Progress Note  Name: Chalo Ahuja I  MRN: [id number]  Unit/Bed#: [address] [place] [address]-01 I Date of Admission: 7/27/2024   Date of Service: 7/29/2024 I Hospital Day: 2    Assessment & Plan   CAD in native artery  Assessment & Plan  S/p stenting 2020 with outside facility, POA no shortness of breath, no chest pain, no palpitations  Continue home beta-blocker therapy, ARB, currently on home Aspirin/Plavix  Initial EKG demonstrated rapid A-fib  Patient self converted to sinus rhythm  Cardiology consulted, appreciate input  Continue lopressor 12.5 mg BID  Stress test 7/20 completed.  No ischemia and normal LV systolic function  Follow-up with outpatient cardiology    * New onset atrial fibrillation (HCC)  Assessment & Plan  Patient presents with new onset A-fib with RVR likely due to alcohol abuse  Vitals improved with Cardizem x 1, IV fluids, stable on admission  Mildly elevated D-dimer, age adjusted to WNL. Hold off on imaging, low suspicion given principal problem  Continue Lopressor 12.5 mg twice daily  AC with therapeutic Lovenox. Price check Eliquis $47/month.  Echo completed 7/29 LVEF 55%, no pericardial effusion   Stress test completed 7/29 no ischemia and normal LV function monitor on telemetry  Converted to NSR 7/28/24, remains NSR   Lovenox discontinued.  Patient with history of frequent falls not a good candidate for long-term antithrombotic therapy  Cardiology consulted, input appreciated  Pt on dual antiplatelet therapy with Aspirin and Plavix      Elevated troponin  Assessment & Plan  ECG: Atrial fibrillation with RVR  Troponin level: 0hr 28, 2hr 270, 4hr 342  Continue ASA, statin, Plavix  Continue telemetry monitoring  Cardiology following appreciate input    Vitamin D deficiency  Assessment & Plan  OTC vitamin D supplementation 2000 IU daily    Mixed hyperlipidemia  Assessment & Plan  Patient appears to be on Repatha with cardiology outpatient    Gastroesophageal reflux  disease without esophagitis  Assessment & Plan  Continue PPI therapy    Depression  Assessment & Plan  Continue Prozac daily, recent loss of mother    Alcohol abuse  Assessment & Plan  Significant history of alcohol abuse, 15+ drinks per day  Remains on CIWA protocol. Required IV ativan overnight 7/27-7/28 for withdrawal symptoms  Folic acid, thiamine, multivitamin supplementation  Case management consulted for ETOH resources  Pt refuses inpatient alcohol rehab services     Leukocytosis-resolved as of 7/29/2024  Assessment & Plan  Leukocytosis likely reactive due to dehydration and afib   Respiratory panel negative  Lactic acid normalized  BC x2 negative  No signs/symptoms of infection, monitor off ABX  WBC down trending to 10K               Medical Problems       Resolved Problems  Date Reviewed: 7/29/2024            Resolved    Leukocytosis 7/29/2024     Resolved by  ANANDA Ureña    Acute metabolic acidosis 7/28/2024     Resolved by  Iman Coon PA-C        Discharging Physician / Practitioner: ANANDA Ureña  PCP: No primary care provider on file.  Admission Date:   Admission Orders (From admission, onward)       Ordered        07/27/24 1505  INPATIENT ADMISSION  Once                          Discharge Date: 07/29/24    Consultations During Hospital Stay:  Cardiology  Crisis    Procedures Performed:   Chest x-ray: No acute cardiopulmonary disease    Significant Findings / Test Results:   New onset A-fib which has now resolved      Reason for Admission: A-fib with RVR, alcohol withdrawal    Hospital Course:   Chalo Ahuja is a 68 y.o. male patient who originally presented to the hospital on 7/27/2024 due to above.  Chest x-ray with no acute cardiopulmonary disease.  Found to be in A-fib with RVR, Trop: 28 (0hr), 270 (2hr), 342 (4hr) , initial 12 lead EKG demonstrated rapid atrial fibrillation Cardizem IV bolus 15 mg given in ED with improvement in heart rate and blood pressure.  Stress  "test with no ischemia and LV function normal.  Per cardiology record, patient is not a good candidate for long-term antithrombotic therapy with history of multiple falls but is recommended to follow-up with cardiology and get outpatient monitoring.  Patient initially agreeable to inpatient alcohol rehab, had a change of mind and chose to go home instead.  Patient is being discharged home to follow-up with cardiology outpatient and PCP for further monitoring.  Alcohol cessation is strongly encouraged.            Please see above list of diagnoses and related plan for additional information.     Condition at Discharge: stable    Discharge Day Visit / Exam:     Subjective: Seen and examined.  Patient is requesting to be discharged home.    Vitals: Blood Pressure: (!) 178/92 (07/29/24 1610)  Pulse: 70 (07/29/24 1610)  Temperature: 98 °F (36.7 °C) (07/29/24 1610)  Temp Source: Oral (07/29/24 0833)  Respirations: 18 (07/29/24 1609)  Height: 5' 9\" (175.3 cm) (07/29/24 0700)  Weight - Scale: 81.2 kg (179 lb) (07/29/24 0700)  SpO2: 98 % (07/29/24 1610)    Exam:   Physical Exam  Vitals and nursing note reviewed.   Constitutional:       General: He is not in acute distress.     Appearance: He is well-developed.   HENT:      Head: Normocephalic and atraumatic.      Mouth/Throat:      Mouth: Mucous membranes are moist.   Eyes:      Conjunctiva/sclera: Conjunctivae normal.   Cardiovascular:      Rate and Rhythm: Normal rate and regular rhythm.      Pulses: Normal pulses.      Heart sounds: No murmur heard.  Pulmonary:      Effort: Pulmonary effort is normal. No respiratory distress.      Breath sounds: Normal breath sounds.   Abdominal:      Palpations: Abdomen is soft.      Tenderness: There is no abdominal tenderness.   Musculoskeletal:         General: No swelling.      Cervical back: Normal range of motion and neck supple.   Skin:     General: Skin is warm and dry.      Capillary Refill: Capillary refill takes less than 2 " seconds.   Neurological:      Mental Status: He is alert.   Psychiatric:         Mood and Affect: Mood normal.          Discussion with Family: Attempted to update  (daughter) via phone. Left voicemail.     Discharge instructions/Information to patient and family:   See after visit summary for information provided to patient and family.      Provisions for Follow-Up Care:  See after visit summary for information related to follow-up care and any pertinent home health orders.      Mobility at time of Discharge:   Basic Mobility Inpatient Raw Score: 24  JH-HLM Goal: 8: Walk 250 feet or more  JH-HLM Achieved: 7: Walk 25 feet or more  HLM Goal achieved. Continue to encourage appropriate mobility.     Disposition:   Home    Planned Readmission:      Discharge Statement:  I spent    minutes discharging the patient. This time was spent on the day of discharge. I had direct contact with the patient on the day of discharge. Greater than 50% of the total time was spent examining patient, answering all patient questions, arranging and discussing plan of care with patient as well as directly providing post-discharge instructions.  Additional time then spent on discharge activities.    Discharge Medications:  See after visit summary for reconciled discharge medications provided to patient and/or family.      **Please Note: This note may have been constructed using a voice recognition system**

## 2024-07-29 NOTE — ASSESSMENT & PLAN NOTE
Leukocytosis likely reactive due to dehydration and afib   Respiratory panel negative  Lactic acid normalized  BC x2 negative  No signs/symptoms of infection, monitor off ABX  WBC down trending to 10K

## 2024-07-29 NOTE — PLAN OF CARE
Problem: Potential for Falls  Goal: Patient will remain free of falls  Description: INTERVENTIONS:  - Educate patient/family on patient safety including physical limitations  - Instruct patient to call for assistance with activity   - Consult OT/PT to assist with strengthening/mobility   - Keep Call bell within reach  - Keep bed low and locked with side rails adjusted as appropriate  - Keep care items and personal belongings within reach  - Initiate and maintain comfort rounds  - Make Fall Risk Sign visible to staff  - Initiate/Maintain bed alarm  - Apply yellow socks and bracelet for high fall risk patients  - Consider moving patient to room near nurses station  Outcome: Progressing     Problem: PAIN - ADULT  Goal: Verbalizes/displays adequate comfort level or baseline comfort level  Description: Interventions:  - Encourage patient to monitor pain and request assistance  - Assess pain using appropriate pain scale  - Administer analgesics based on type and severity of pain and evaluate response  - Implement non-pharmacological measures as appropriate and evaluate response  - Consider cultural and social influences on pain and pain management  - Notify physician/advanced practitioner if interventions unsuccessful or patient reports new pain  Outcome: Progressing     Problem: INFECTION - ADULT  Goal: Absence or prevention of progression during hospitalization  Description: INTERVENTIONS:  - Assess and monitor for signs and symptoms of infection  - Monitor lab/diagnostic results  - Monitor all insertion sites, i.e. indwelling lines, tubes, and drains  - [place] appropriate cooling/warming therapies per order  - Administer medications as ordered  - Instruct and encourage patient and family to use good hand hygiene technique  - Identify and instruct in appropriate isolation precautions for identified infection/condition  Outcome: Progressing     Problem: SAFETY ADULT  Goal: Patient will remain free of  falls  Description: INTERVENTIONS:  - Educate patient/family on patient safety including physical limitations  - Instruct patient to call for assistance with activity   - Consult OT/PT to assist with strengthening/mobility   - Keep Call bell within reach  - Keep bed low and locked with side rails adjusted as appropriate  - Keep care items and personal belongings within reach  - Initiate and maintain comfort rounds  - Make Fall Risk Sign visible to staff  - Initiate/Maintain bed alarm  - Apply yellow socks and bracelet for high fall risk patients  - Consider moving patient to room near nurses station  Outcome: Progressing  Goal: Maintain or return to baseline ADL function  Description: INTERVENTIONS:  -  Assess patient's ability to carry out ADLs; assess patient's baseline for ADL function and identify physical deficits which impact ability to perform ADLs (bathing, care of mouth/teeth, toileting, grooming, dressing, etc.)  - Assess/evaluate cause of self-care deficits   - Assess range of motion  - Assess patient's mobility; develop plan if impaired  - Assess patient's need for assistive devices and provide as appropriate  - Encourage maximum independence but intervene and supervise when necessary  - Involve family in performance of ADLs  - Assess for home care needs following discharge   - Consider OT consult to assist with ADL evaluation and planning for discharge  - Provide patient education as appropriate  Outcome: Progressing     Problem: DISCHARGE PLANNING  Goal: Discharge to home or other facility with appropriate resources  Description: INTERVENTIONS:  - Identify barriers to discharge w/patient and caregiver  - Arrange for needed discharge resources and transportation as appropriate  - Identify discharge learning needs (meds, wound care, etc.)  - Arrange for interpretive services to assist at discharge as needed  - Refer to Case Management Department for coordinating discharge planning if the patient needs  post-hospital services based on physician/advanced practitioner order or complex needs related to functional status, cognitive ability, or social support system  Outcome: Progressing     Problem: Knowledge Deficit  Goal: Patient/family/caregiver demonstrates understanding of disease process, treatment plan, medications, and discharge instructions  Description: Complete learning assessment and assess knowledge base.  Interventions:  - Provide teaching at level of understanding  - Provide teaching via preferred learning methods  Outcome: Progressing     Problem: NEUROSENSORY - ADULT  Goal: Achieves stable or improved neurological status  Description: INTERVENTIONS  - Monitor and report changes in neurological status  - Monitor vital signs such as temperature, blood pressure, glucose, and any other labs ordered   - Initiate measures to prevent increased intracranial pressure  - Monitor for seizure activity and implement precautions if appropriate      Outcome: Progressing  Goal: Remains free of injury related to seizures activity  Description: INTERVENTIONS  - Maintain airway, patient safety  and administer oxygen as ordered  - Monitor patient for seizure activity, document and report duration and description of seizure to physician/advanced practitioner  - If seizure occurs,  ensure patient safety during seizure  - Reorient patient post seizure  - Seizure pads on all 4 side rails  - Instruct patient/family to notify RN of any seizure activity including if an aura is experienced  - Instruct patient/family to call for assistance with activity based on nursing assessment  - Administer anti-seizure medications if ordered    Outcome: Progressing     Problem: CARDIOVASCULAR - ADULT  Goal: Maintains optimal cardiac output and hemodynamic stability  Description: INTERVENTIONS:  - Monitor I/O, vital signs and rhythm  - Monitor for S/S and trends of decreased cardiac output  - Administer and titrate ordered vasoactive  medications to optimize hemodynamic stability  - Assess quality of pulses, skin color and temperature  - Assess for signs of decreased coronary artery perfusion  - Instruct patient to report change in severity of symptoms  Outcome: Progressing  Goal: Absence of cardiac dysrhythmias or at baseline rhythm  Description: INTERVENTIONS:  - Continuous cardiac monitoring, vital signs, obtain 12 lead EKG if ordered  - Administer antiarrhythmic and heart rate control medications as ordered  - Monitor electrolytes and administer replacement therapy as ordered  Outcome: Progressing     Problem: METABOLIC, FLUID AND ELECTROLYTES - ADULT  Goal: Electrolytes maintained within normal limits  Description: INTERVENTIONS:  - Monitor labs and assess patient for signs and symptoms of electrolyte imbalances  - Administer electrolyte replacement as ordered  - Monitor response to electrolyte replacements, including repeat lab results as appropriate  - Instruct patient on fluid and nutrition as appropriate  Outcome: Progressing  Goal: Fluid balance maintained  Description: INTERVENTIONS:  - Monitor labs   - Monitor I/O and WT  - Instruct patient on fluid and nutrition as appropriate  - Assess for signs & symptoms of volume excess or deficit  Outcome: Progressing

## 2024-07-30 LAB
ATRIAL RATE: 77 BPM
P AXIS: 66 DEGREES
PR INTERVAL: 210 MS
QRS AXIS: 60 DEGREES
QRSD INTERVAL: 106 MS
QT INTERVAL: 414 MS
QTC INTERVAL: 468 MS
T WAVE AXIS: 31 DEGREES
VENTRICULAR RATE: 77 BPM

## 2024-07-30 PROCEDURE — 93010 ELECTROCARDIOGRAM REPORT: CPT | Performed by: INTERNAL MEDICINE

## 2024-08-02 LAB
BACTERIA BLD CULT: NORMAL
BACTERIA BLD CULT: NORMAL